# Patient Record
Sex: FEMALE | Race: WHITE | NOT HISPANIC OR LATINO
[De-identification: names, ages, dates, MRNs, and addresses within clinical notes are randomized per-mention and may not be internally consistent; named-entity substitution may affect disease eponyms.]

---

## 2017-01-06 ENCOUNTER — APPOINTMENT (OUTPATIENT)
Dept: NEUROLOGY | Facility: CLINIC | Age: 82
End: 2017-01-06

## 2017-01-17 ENCOUNTER — RX RENEWAL (OUTPATIENT)
Age: 82
End: 2017-01-17

## 2017-02-15 ENCOUNTER — APPOINTMENT (OUTPATIENT)
Dept: NEUROLOGY | Facility: CLINIC | Age: 82
End: 2017-02-15

## 2017-03-06 ENCOUNTER — APPOINTMENT (OUTPATIENT)
Dept: HEART AND VASCULAR | Facility: CLINIC | Age: 82
End: 2017-03-06

## 2017-03-06 VITALS
WEIGHT: 150 LBS | HEIGHT: 57.5 IN | DIASTOLIC BLOOD PRESSURE: 80 MMHG | HEART RATE: 67 BPM | BODY MASS INDEX: 31.92 KG/M2 | SYSTOLIC BLOOD PRESSURE: 140 MMHG

## 2017-03-06 DIAGNOSIS — I27.2 OTHER SECONDARY PULMONARY HYPERTENSION: ICD-10-CM

## 2017-03-07 ENCOUNTER — APPOINTMENT (OUTPATIENT)
Dept: NEUROLOGY | Facility: CLINIC | Age: 82
End: 2017-03-07

## 2017-03-13 ENCOUNTER — APPOINTMENT (OUTPATIENT)
Dept: NEUROLOGY | Facility: CLINIC | Age: 82
End: 2017-03-13

## 2017-03-23 ENCOUNTER — APPOINTMENT (OUTPATIENT)
Dept: OPHTHALMOLOGY | Facility: CLINIC | Age: 82
End: 2017-03-23

## 2017-04-19 ENCOUNTER — APPOINTMENT (OUTPATIENT)
Dept: NEUROLOGY | Facility: CLINIC | Age: 82
End: 2017-04-19

## 2017-04-19 VITALS
SYSTOLIC BLOOD PRESSURE: 183 MMHG | OXYGEN SATURATION: 96 % | BODY MASS INDEX: 32.13 KG/M2 | HEIGHT: 57.5 IN | WEIGHT: 151 LBS | DIASTOLIC BLOOD PRESSURE: 83 MMHG | HEART RATE: 62 BPM

## 2017-04-19 VITALS — DIASTOLIC BLOOD PRESSURE: 89 MMHG | SYSTOLIC BLOOD PRESSURE: 168 MMHG

## 2017-04-25 ENCOUNTER — APPOINTMENT (OUTPATIENT)
Dept: INTERNAL MEDICINE | Facility: CLINIC | Age: 82
End: 2017-04-25

## 2017-04-25 VITALS
WEIGHT: 151 LBS | BODY MASS INDEX: 32.11 KG/M2 | OXYGEN SATURATION: 91 % | SYSTOLIC BLOOD PRESSURE: 148 MMHG | DIASTOLIC BLOOD PRESSURE: 94 MMHG | HEART RATE: 74 BPM | TEMPERATURE: 98.5 F

## 2017-04-25 DIAGNOSIS — Z92.89 PERSONAL HISTORY OF OTHER MEDICAL TREATMENT: ICD-10-CM

## 2017-04-25 DIAGNOSIS — R41.3 OTHER AMNESIA: ICD-10-CM

## 2017-04-26 LAB
ALBUMIN SERPL ELPH-MCNC: 4.3 G/DL
ALP BLD-CCNC: 98 U/L
ALT SERPL-CCNC: 18 U/L
ANION GAP SERPL CALC-SCNC: 15 MMOL/L
AST SERPL-CCNC: 21 U/L
BASOPHILS # BLD AUTO: 0.05 K/UL
BASOPHILS NFR BLD AUTO: 0.7 %
BILIRUB SERPL-MCNC: 0.5 MG/DL
BUN SERPL-MCNC: 17 MG/DL
CALCIUM SERPL-MCNC: 10.1 MG/DL
CHLORIDE SERPL-SCNC: 94 MMOL/L
CK SERPL-CCNC: 84 U/L
CO2 SERPL-SCNC: 24 MMOL/L
CREAT SERPL-MCNC: 0.73 MG/DL
EOSINOPHIL # BLD AUTO: 0.12 K/UL
EOSINOPHIL NFR BLD AUTO: 1.7 %
GLUCOSE SERPL-MCNC: 88 MG/DL
HBA1C MFR BLD HPLC: 5.6 %
HCT VFR BLD CALC: 40.9 %
HGB BLD-MCNC: 13.4 G/DL
IMM GRANULOCYTES NFR BLD AUTO: 0 %
LYMPHOCYTES # BLD AUTO: 2.15 K/UL
LYMPHOCYTES NFR BLD AUTO: 30.8 %
MAN DIFF?: NORMAL
MCHC RBC-ENTMCNC: 29.8 PG
MCHC RBC-ENTMCNC: 32.8 GM/DL
MCV RBC AUTO: 90.9 FL
MONOCYTES # BLD AUTO: 0.73 K/UL
MONOCYTES NFR BLD AUTO: 10.5 %
NEUTROPHILS # BLD AUTO: 3.93 K/UL
NEUTROPHILS NFR BLD AUTO: 56.3 %
PLATELET # BLD AUTO: 255 K/UL
POTASSIUM SERPL-SCNC: 4.5 MMOL/L
PROT SERPL-MCNC: 7.4 G/DL
RBC # BLD: 4.5 M/UL
RBC # FLD: 15.2 %
SODIUM SERPL-SCNC: 133 MMOL/L
TSH SERPL-ACNC: 2.54 UIU/ML
VIT B12 SERPL-MCNC: 689 PG/ML
WBC # FLD AUTO: 6.98 K/UL

## 2017-05-07 ENCOUNTER — FORM ENCOUNTER (OUTPATIENT)
Age: 82
End: 2017-05-07

## 2017-05-08 ENCOUNTER — OUTPATIENT (OUTPATIENT)
Dept: OUTPATIENT SERVICES | Facility: HOSPITAL | Age: 82
LOS: 1 days | End: 2017-05-08
Payer: MEDICARE

## 2017-05-08 ENCOUNTER — RX RENEWAL (OUTPATIENT)
Age: 82
End: 2017-05-08

## 2017-05-08 PROCEDURE — 70551 MRI BRAIN STEM W/O DYE: CPT | Mod: 26

## 2017-05-08 PROCEDURE — 70551 MRI BRAIN STEM W/O DYE: CPT

## 2017-05-22 ENCOUNTER — INPATIENT (INPATIENT)
Facility: HOSPITAL | Age: 82
LOS: 7 days | Discharge: NURSING, REHAB OR LONG-TERM CARE | DRG: 481 | End: 2017-05-30
Attending: ORTHOPAEDIC SURGERY | Admitting: ORTHOPAEDIC SURGERY
Payer: MEDICARE

## 2017-05-22 VITALS
SYSTOLIC BLOOD PRESSURE: 193 MMHG | OXYGEN SATURATION: 99 % | DIASTOLIC BLOOD PRESSURE: 114 MMHG | HEART RATE: 83 BPM | WEIGHT: 190.04 LBS | TEMPERATURE: 98 F | RESPIRATION RATE: 20 BRPM | HEIGHT: 66 IN

## 2017-05-22 DIAGNOSIS — E78.5 HYPERLIPIDEMIA, UNSPECIFIED: ICD-10-CM

## 2017-05-22 DIAGNOSIS — R91.1 SOLITARY PULMONARY NODULE: ICD-10-CM

## 2017-05-22 DIAGNOSIS — E87.1 HYPO-OSMOLALITY AND HYPONATREMIA: ICD-10-CM

## 2017-05-22 DIAGNOSIS — M25.552 PAIN IN LEFT HIP: ICD-10-CM

## 2017-05-22 DIAGNOSIS — I10 ESSENTIAL (PRIMARY) HYPERTENSION: ICD-10-CM

## 2017-05-22 DIAGNOSIS — J44.9 CHRONIC OBSTRUCTIVE PULMONARY DISEASE, UNSPECIFIED: ICD-10-CM

## 2017-05-22 DIAGNOSIS — Z01.818 ENCOUNTER FOR OTHER PREPROCEDURAL EXAMINATION: ICD-10-CM

## 2017-05-22 LAB
ALBUMIN SERPL ELPH-MCNC: 4 G/DL — SIGNIFICANT CHANGE UP (ref 3.3–5)
ALP SERPL-CCNC: 98 U/L — SIGNIFICANT CHANGE UP (ref 40–120)
ALT FLD-CCNC: 19 U/L — SIGNIFICANT CHANGE UP (ref 10–45)
ANION GAP SERPL CALC-SCNC: 13 MMOL/L — SIGNIFICANT CHANGE UP (ref 5–17)
AST SERPL-CCNC: 26 U/L — SIGNIFICANT CHANGE UP (ref 10–40)
BILIRUB SERPL-MCNC: 0.4 MG/DL — SIGNIFICANT CHANGE UP (ref 0.2–1.2)
BLD GP AB SCN SERPL QL: NEGATIVE — SIGNIFICANT CHANGE UP
BUN SERPL-MCNC: 16 MG/DL — SIGNIFICANT CHANGE UP (ref 7–23)
CALCIUM SERPL-MCNC: 9.4 MG/DL — SIGNIFICANT CHANGE UP (ref 8.4–10.5)
CHLORIDE SERPL-SCNC: 95 MMOL/L — LOW (ref 96–108)
CO2 SERPL-SCNC: 23 MMOL/L — SIGNIFICANT CHANGE UP (ref 22–31)
CREAT SERPL-MCNC: 0.6 MG/DL — SIGNIFICANT CHANGE UP (ref 0.5–1.3)
GLUCOSE SERPL-MCNC: 120 MG/DL — HIGH (ref 70–99)
HCT VFR BLD CALC: 38.9 % — SIGNIFICANT CHANGE UP (ref 34.5–45)
HGB BLD-MCNC: 13.7 G/DL — SIGNIFICANT CHANGE UP (ref 11.5–15.5)
INR BLD: 1.03 — SIGNIFICANT CHANGE UP (ref 0.88–1.16)
MCHC RBC-ENTMCNC: 30.3 PG — SIGNIFICANT CHANGE UP (ref 27–34)
MCHC RBC-ENTMCNC: 35.2 G/DL — SIGNIFICANT CHANGE UP (ref 32–36)
MCV RBC AUTO: 86.1 FL — SIGNIFICANT CHANGE UP (ref 80–100)
PLATELET # BLD AUTO: 249 K/UL — SIGNIFICANT CHANGE UP (ref 150–400)
POTASSIUM SERPL-MCNC: 4 MMOL/L — SIGNIFICANT CHANGE UP (ref 3.5–5.3)
POTASSIUM SERPL-SCNC: 4 MMOL/L — SIGNIFICANT CHANGE UP (ref 3.5–5.3)
PROT SERPL-MCNC: 7.5 G/DL — SIGNIFICANT CHANGE UP (ref 6–8.3)
PROTHROM AB SERPL-ACNC: 11.4 SEC — SIGNIFICANT CHANGE UP (ref 9.8–12.7)
RBC # BLD: 4.52 M/UL — SIGNIFICANT CHANGE UP (ref 3.8–5.2)
RBC # FLD: 14 % — SIGNIFICANT CHANGE UP (ref 10.3–16.9)
RH IG SCN BLD-IMP: POSITIVE — SIGNIFICANT CHANGE UP
RH IG SCN BLD-IMP: POSITIVE — SIGNIFICANT CHANGE UP
SODIUM SERPL-SCNC: 131 MMOL/L — LOW (ref 135–145)
WBC # BLD: 12.8 K/UL — HIGH (ref 3.8–10.5)
WBC # FLD AUTO: 12.8 K/UL — HIGH (ref 3.8–10.5)

## 2017-05-22 PROCEDURE — 99285 EMERGENCY DEPT VISIT HI MDM: CPT | Mod: 25

## 2017-05-22 PROCEDURE — 93010 ELECTROCARDIOGRAM REPORT: CPT

## 2017-05-22 PROCEDURE — 71101 X-RAY EXAM UNILAT RIBS/CHEST: CPT | Mod: 26

## 2017-05-22 PROCEDURE — 73502 X-RAY EXAM HIP UNI 2-3 VIEWS: CPT | Mod: 26,LT

## 2017-05-22 PROCEDURE — 73552 X-RAY EXAM OF FEMUR 2/>: CPT | Mod: 26,LT

## 2017-05-22 PROCEDURE — 99223 1ST HOSP IP/OBS HIGH 75: CPT | Mod: GC

## 2017-05-22 RX ORDER — TRAMADOL HYDROCHLORIDE 50 MG/1
50 TABLET ORAL EVERY 4 HOURS
Qty: 0 | Refills: 0 | Status: DISCONTINUED | OUTPATIENT
Start: 2017-05-22 | End: 2017-05-23

## 2017-05-22 RX ORDER — DOCUSATE SODIUM 100 MG
100 CAPSULE ORAL THREE TIMES A DAY
Qty: 0 | Refills: 0 | Status: DISCONTINUED | OUTPATIENT
Start: 2017-05-22 | End: 2017-05-23

## 2017-05-22 RX ORDER — MORPHINE SULFATE 50 MG/1
2 CAPSULE, EXTENDED RELEASE ORAL EVERY 4 HOURS
Qty: 0 | Refills: 0 | Status: DISCONTINUED | OUTPATIENT
Start: 2017-05-22 | End: 2017-05-23

## 2017-05-22 RX ORDER — MORPHINE SULFATE 50 MG/1
4 CAPSULE, EXTENDED RELEASE ORAL ONCE
Qty: 0 | Refills: 0 | Status: DISCONTINUED | OUTPATIENT
Start: 2017-05-22 | End: 2017-05-22

## 2017-05-22 RX ORDER — OXYCODONE HYDROCHLORIDE 5 MG/1
10 TABLET ORAL EVERY 4 HOURS
Qty: 0 | Refills: 0 | Status: DISCONTINUED | OUTPATIENT
Start: 2017-05-22 | End: 2017-05-23

## 2017-05-22 RX ORDER — IPRATROPIUM/ALBUTEROL SULFATE 18-103MCG
3 AEROSOL WITH ADAPTER (GRAM) INHALATION EVERY 6 HOURS
Qty: 0 | Refills: 0 | Status: DISCONTINUED | OUTPATIENT
Start: 2017-05-22 | End: 2017-05-23

## 2017-05-22 RX ORDER — LOSARTAN POTASSIUM 100 MG/1
50 TABLET, FILM COATED ORAL DAILY
Qty: 0 | Refills: 0 | Status: DISCONTINUED | OUTPATIENT
Start: 2017-05-22 | End: 2017-05-23

## 2017-05-22 RX ORDER — IPRATROPIUM/ALBUTEROL SULFATE 18-103MCG
3 AEROSOL WITH ADAPTER (GRAM) INHALATION ONCE
Qty: 0 | Refills: 0 | Status: COMPLETED | OUTPATIENT
Start: 2017-05-22 | End: 2017-05-22

## 2017-05-22 RX ORDER — OXYCODONE HYDROCHLORIDE 5 MG/1
5 TABLET ORAL EVERY 4 HOURS
Qty: 0 | Refills: 0 | Status: DISCONTINUED | OUTPATIENT
Start: 2017-05-22 | End: 2017-05-22

## 2017-05-22 RX ORDER — SODIUM CHLORIDE 9 MG/ML
1000 INJECTION, SOLUTION INTRAVENOUS
Qty: 0 | Refills: 0 | Status: DISCONTINUED | OUTPATIENT
Start: 2017-05-22 | End: 2017-05-23

## 2017-05-22 RX ORDER — SIMVASTATIN 20 MG/1
10 TABLET, FILM COATED ORAL AT BEDTIME
Qty: 0 | Refills: 0 | Status: DISCONTINUED | OUTPATIENT
Start: 2017-05-22 | End: 2017-05-23

## 2017-05-22 RX ORDER — MORPHINE SULFATE 50 MG/1
4 CAPSULE, EXTENDED RELEASE ORAL EVERY 4 HOURS
Qty: 0 | Refills: 0 | Status: DISCONTINUED | OUTPATIENT
Start: 2017-05-22 | End: 2017-05-23

## 2017-05-22 RX ADMIN — MORPHINE SULFATE 4 MILLIGRAM(S): 50 CAPSULE, EXTENDED RELEASE ORAL at 22:15

## 2017-05-22 RX ADMIN — MORPHINE SULFATE 4 MILLIGRAM(S): 50 CAPSULE, EXTENDED RELEASE ORAL at 16:02

## 2017-05-22 RX ADMIN — Medication 3 MILLILITER(S): at 16:02

## 2017-05-22 RX ADMIN — MORPHINE SULFATE 4 MILLIGRAM(S): 50 CAPSULE, EXTENDED RELEASE ORAL at 16:40

## 2017-05-22 RX ADMIN — MORPHINE SULFATE 4 MILLIGRAM(S): 50 CAPSULE, EXTENDED RELEASE ORAL at 22:30

## 2017-05-22 RX ADMIN — MORPHINE SULFATE 4 MILLIGRAM(S): 50 CAPSULE, EXTENDED RELEASE ORAL at 18:51

## 2017-05-22 NOTE — CONSULT NOTE ADULT - PROBLEM SELECTOR RECOMMENDATION 7
the patient had a CT scan of chest followed by bronchoscopy with biopsy.  The results werr negative for underlying cancer cells.  Some of the nodules increased in size.  Repeat CT scan of chest

## 2017-05-22 NOTE — CONSULT NOTE ADULT - PROBLEM SELECTOR RECOMMENDATION 4
The patient's medical condition is optimized for surgery.  There is no contraindication for surgery.  There is no clinical evidence neither of angina, decompensated CHF, arrhthymias, nor valvular disease.   There is no limitation of exercise capacity.  MET is 4 .  ASA class is 2.  [place] cardiac risk factor is low .  DVT prophylaxis is indicated.  Pain control.  Early mobilization.  Avoid fluid overload.  Her dyspnea most likely relate dto COPD.  There I no evidence of CAD.

## 2017-05-22 NOTE — H&P ADULT - ASSESSMENT
A/P: 80yF with left hip fracture, possible left rib fractures, stable otherwise  -admit to Orthopaedic service  -operative treatment ORIF (IMN)  -preoperative planning: NPO, IVF  -pain control  -obtain medical clearance  -hold DVT prophylaxis  -restart home medications  -monitor vital signs  -nonweightbearing status until post procedure  -physical therapy evaluation  -discussed with senior resident and attending on call

## 2017-05-22 NOTE — ED PROVIDER NOTE - MUSCULOSKELETAL, MLM
Spine appears normal, range of motion is not limited, no muscle or joint tenderness Spine appears normal, range of motion is not limited, left hip deformity, leg length discrepancy and severe TTPleft hip, Pelvis stable. Distal ext NVI. +left posterior rib pain, no crepitus of flail.

## 2017-05-22 NOTE — ED PROVIDER NOTE - OBJECTIVE STATEMENT
81F with a h/o COPD, HTN, high cholesterol, depression who p/w left hip and side pain s/p mechanical fall today when she slipped in the hallway outside of her apartment. no head trauma, no LOC. Pt c/o severe 10/10 left hip pain and also left posterior rib pain. She also feels SOB when laying flat, c/w her COPD. Denies n/t in her ext. No headache or neck pain, no on bloodthinners. 81F with a h/o COPD, HTN, high cholesterol, depression who p/w left hip and side pain s/p mechanical fall today when she slipped in the hallway outside of her apartment. no head trauma, no LOC. Pt c/o severe 10/10 left hip pain and also left posterior rib pain. She also feels SOB when laying flat, c/w her COPD. Denies n/t in her ext. No headache or neck pain, not on blood thinners.

## 2017-05-22 NOTE — H&P ADULT - NSHPPHYSICALEXAM_GEN_ALL_CORE
5/5 GS/TA/EHL/FHL BL SILT to patients baseline. Left thigh minimally swollen, gross deformity, no ecchymoses. Skin intact, no breakage, no tenting. Palpable DP/PT BL. ROM and leg roll of left leg limited 2/2 pain.  TTP over anterior left thigh. Able to straight leg raise on right, not on left. Left leg shorter than right.     TTP over anterior-mid chest along ribs, diffuse, not focal    Skeletal survey negative. BUE motor and sensory intact, no contusion abrasion or injury. BLE intact other than previously noted.    Radiographs: left hip fracture, intertrochanteric, seen on hip and femur views. Rib views difficult to evaluate 5/5 GS/TA/EHL/FHL BL SILT to patients baseline. Left thigh minimally swollen, gross deformity, no ecchymoses. Skin intact, no breakage, no tenting. Palpable DP/PT BL. ROM and leg roll of left leg limited 2/2 pain.  TTP over anterior left thigh. Able to straight leg raise on right, not on left. Left leg shorter than right.     TTP over anterior-mid chest along ribs, diffuse, not focal    Skeletal survey negative. BLUE motor and sensory intact, no contusion abrasion or injury. BLLE intact other than previously noted.    Radiographs: left hip fracture, intertrochanteric, seen on hip and femur views. Rib views difficult to evaluate

## 2017-05-22 NOTE — ED ADULT NURSE NOTE - OBJECTIVE STATEMENT
Patient c/o left hip and rib pain after mechanical fall today. Patient denies loc. Left leg rotated and shortened. Patient unable to move leg. HRR S1S2. Lungs clear, no cough. Will continue to monitor patient.

## 2017-05-22 NOTE — H&P ADULT - HISTORY OF PRESENT ILLNESS
80y female PMH COPD, HLD, depression was walking at home and fell mechanically. Denies LOC, other injury besides hip and rib pain, denies numbness or tingling. ROS grossly negative, patient accompanied by son. Lives at home. Ambulates independently at baseline.

## 2017-05-22 NOTE — H&P ADULT - ATTENDING COMMENTS
Pt seen/ examined  Imaging studies reviewed  Risks/ benefits/ alternatives of surgery discussed with patient  Medical optimization pre-/ post-op  Will proceed to OR

## 2017-05-22 NOTE — ED PROVIDER NOTE - CONSTITUTIONAL, MLM
normal... Well appearing, well nourished, awake, alert, oriented to person, place, time/situation and distress due to pain

## 2017-05-22 NOTE — ED PROVIDER NOTE - MEDICAL DECISION MAKING DETAILS
81F with above PMHx with mechanical fall at home and exam consistent with left hip fracture and possible left posterior rib fractures. Pt also with slight wheezes and O2 93 c/w COPD- improved with nebs. Xr confirm left intertroch fx, possible left post rib fx, but no ptx on XR. Ortho consulted and will admit pt, requesting CT chest for further eval of ribs, which was ordered. pt pain controlled with morphine and VSS. Admit ortho.

## 2017-05-22 NOTE — ED ADULT TRIAGE NOTE - CHIEF COMPLAINT QUOTE
Pt CO Cough and Asthma Exacerbation x2 weeks.  Pt states "I just can't get any sleep and I feel like my chest is congested."  Pt denies Fevers, N/V/D, pt had mechanical fall & c/o left hip pain and rib pain

## 2017-05-22 NOTE — ED ADULT TRIAGE NOTE - NS ED TRIAGE CLINICAL UPGRADE
Pain - Patient was clinically upgraded due to pain. Deteriorating patient status - Patient was clinically upgraded due to deteriorating patient status.

## 2017-05-23 DIAGNOSIS — I35.0 NONRHEUMATIC AORTIC (VALVE) STENOSIS: ICD-10-CM

## 2017-05-23 DIAGNOSIS — S22.39XA FRACTURE OF ONE RIB, UNSPECIFIED SIDE, INITIAL ENCOUNTER FOR CLOSED FRACTURE: ICD-10-CM

## 2017-05-23 DIAGNOSIS — I27.2 OTHER SECONDARY PULMONARY HYPERTENSION: ICD-10-CM

## 2017-05-23 LAB
ANION GAP SERPL CALC-SCNC: 10 MMOL/L — SIGNIFICANT CHANGE UP (ref 5–17)
ANION GAP SERPL CALC-SCNC: 12 MMOL/L — SIGNIFICANT CHANGE UP (ref 5–17)
APPEARANCE UR: CLEAR — SIGNIFICANT CHANGE UP
BILIRUB UR-MCNC: NEGATIVE — SIGNIFICANT CHANGE UP
BUN SERPL-MCNC: 11 MG/DL — SIGNIFICANT CHANGE UP (ref 7–23)
BUN SERPL-MCNC: 14 MG/DL — SIGNIFICANT CHANGE UP (ref 7–23)
CALCIUM SERPL-MCNC: 8.2 MG/DL — LOW (ref 8.4–10.5)
CALCIUM SERPL-MCNC: 8.9 MG/DL — SIGNIFICANT CHANGE UP (ref 8.4–10.5)
CHLORIDE SERPL-SCNC: 94 MMOL/L — LOW (ref 96–108)
CHLORIDE SERPL-SCNC: 96 MMOL/L — SIGNIFICANT CHANGE UP (ref 96–108)
CO2 SERPL-SCNC: 23 MMOL/L — SIGNIFICANT CHANGE UP (ref 22–31)
CO2 SERPL-SCNC: 25 MMOL/L — SIGNIFICANT CHANGE UP (ref 22–31)
COLOR SPEC: YELLOW — SIGNIFICANT CHANGE UP
CREAT SERPL-MCNC: 0.6 MG/DL — SIGNIFICANT CHANGE UP (ref 0.5–1.3)
CREAT SERPL-MCNC: 0.6 MG/DL — SIGNIFICANT CHANGE UP (ref 0.5–1.3)
DIFF PNL FLD: NEGATIVE — SIGNIFICANT CHANGE UP
GLUCOSE SERPL-MCNC: 105 MG/DL — HIGH (ref 70–99)
GLUCOSE SERPL-MCNC: 125 MG/DL — HIGH (ref 70–99)
GLUCOSE UR QL: NEGATIVE — SIGNIFICANT CHANGE UP
HCT VFR BLD CALC: 31.5 % — LOW (ref 34.5–45)
HCT VFR BLD CALC: 35 % — SIGNIFICANT CHANGE UP (ref 34.5–45)
HGB BLD-MCNC: 10.8 G/DL — LOW (ref 11.5–15.5)
HGB BLD-MCNC: 12.7 G/DL — SIGNIFICANT CHANGE UP (ref 11.5–15.5)
KETONES UR-MCNC: NEGATIVE — SIGNIFICANT CHANGE UP
LEUKOCYTE ESTERASE UR-ACNC: NEGATIVE — SIGNIFICANT CHANGE UP
MCHC RBC-ENTMCNC: 30.2 PG — SIGNIFICANT CHANGE UP (ref 27–34)
MCHC RBC-ENTMCNC: 32 PG — SIGNIFICANT CHANGE UP (ref 27–34)
MCHC RBC-ENTMCNC: 34.3 G/DL — SIGNIFICANT CHANGE UP (ref 32–36)
MCHC RBC-ENTMCNC: 36.3 G/DL — HIGH (ref 32–36)
MCV RBC AUTO: 88 FL — SIGNIFICANT CHANGE UP (ref 80–100)
MCV RBC AUTO: 88.2 FL — SIGNIFICANT CHANGE UP (ref 80–100)
MRSA PCR RESULT.: NEGATIVE — SIGNIFICANT CHANGE UP
NITRITE UR-MCNC: NEGATIVE — SIGNIFICANT CHANGE UP
PH UR: 6 — SIGNIFICANT CHANGE UP (ref 5–8)
PLATELET # BLD AUTO: 199 K/UL — SIGNIFICANT CHANGE UP (ref 150–400)
PLATELET # BLD AUTO: 224 K/UL — SIGNIFICANT CHANGE UP (ref 150–400)
POTASSIUM SERPL-MCNC: 4.3 MMOL/L — SIGNIFICANT CHANGE UP (ref 3.5–5.3)
POTASSIUM SERPL-MCNC: 4.7 MMOL/L — SIGNIFICANT CHANGE UP (ref 3.5–5.3)
POTASSIUM SERPL-SCNC: 4.3 MMOL/L — SIGNIFICANT CHANGE UP (ref 3.5–5.3)
POTASSIUM SERPL-SCNC: 4.7 MMOL/L — SIGNIFICANT CHANGE UP (ref 3.5–5.3)
PROT UR-MCNC: NEGATIVE MG/DL — SIGNIFICANT CHANGE UP
RBC # BLD: 3.58 M/UL — LOW (ref 3.8–5.2)
RBC # BLD: 3.97 M/UL — SIGNIFICANT CHANGE UP (ref 3.8–5.2)
RBC # FLD: 14.2 % — SIGNIFICANT CHANGE UP (ref 10.3–16.9)
RBC # FLD: 14.7 % — SIGNIFICANT CHANGE UP (ref 10.3–16.9)
S AUREUS DNA NOSE QL NAA+PROBE: POSITIVE
SODIUM SERPL-SCNC: 129 MMOL/L — LOW (ref 135–145)
SODIUM SERPL-SCNC: 131 MMOL/L — LOW (ref 135–145)
SP GR SPEC: 1.01 — SIGNIFICANT CHANGE UP (ref 1–1.03)
UROBILINOGEN FLD QL: 0.2 E.U./DL — SIGNIFICANT CHANGE UP
WBC # BLD: 13.8 K/UL — HIGH (ref 3.8–10.5)
WBC # BLD: 15.4 K/UL — HIGH (ref 3.8–10.5)
WBC # FLD AUTO: 13.8 K/UL — HIGH (ref 3.8–10.5)
WBC # FLD AUTO: 15.4 K/UL — HIGH (ref 3.8–10.5)

## 2017-05-23 PROCEDURE — 93306 TTE W/DOPPLER COMPLETE: CPT | Mod: 26

## 2017-05-23 PROCEDURE — 99232 SBSQ HOSP IP/OBS MODERATE 35: CPT | Mod: GC

## 2017-05-23 PROCEDURE — 99233 SBSQ HOSP IP/OBS HIGH 50: CPT | Mod: GC

## 2017-05-23 PROCEDURE — 73551 X-RAY EXAM OF FEMUR 1: CPT | Mod: 26,LT

## 2017-05-23 PROCEDURE — 71250 CT THORAX DX C-: CPT | Mod: 26

## 2017-05-23 PROCEDURE — 72170 X-RAY EXAM OF PELVIS: CPT | Mod: 26

## 2017-05-23 RX ORDER — MORPHINE SULFATE 50 MG/1
2 CAPSULE, EXTENDED RELEASE ORAL
Qty: 0 | Refills: 0 | Status: DISCONTINUED | OUTPATIENT
Start: 2017-05-23 | End: 2017-05-24

## 2017-05-23 RX ORDER — SENNA PLUS 8.6 MG/1
2 TABLET ORAL AT BEDTIME
Qty: 0 | Refills: 0 | Status: DISCONTINUED | OUTPATIENT
Start: 2017-05-23 | End: 2017-05-30

## 2017-05-23 RX ORDER — POLYETHYLENE GLYCOL 3350 17 G/17G
17 POWDER, FOR SOLUTION ORAL DAILY
Qty: 0 | Refills: 0 | Status: DISCONTINUED | OUTPATIENT
Start: 2017-05-23 | End: 2017-05-30

## 2017-05-23 RX ORDER — SIMVASTATIN 20 MG/1
10 TABLET, FILM COATED ORAL AT BEDTIME
Qty: 0 | Refills: 0 | Status: DISCONTINUED | OUTPATIENT
Start: 2017-05-23 | End: 2017-05-30

## 2017-05-23 RX ORDER — ONDANSETRON 8 MG/1
4 TABLET, FILM COATED ORAL EVERY 4 HOURS
Qty: 0 | Refills: 0 | Status: COMPLETED | OUTPATIENT
Start: 2017-05-23 | End: 2017-05-23

## 2017-05-23 RX ORDER — SODIUM CHLORIDE 9 MG/ML
1000 INJECTION, SOLUTION INTRAVENOUS
Qty: 0 | Refills: 0 | Status: DISCONTINUED | OUTPATIENT
Start: 2017-05-23 | End: 2017-05-24

## 2017-05-23 RX ORDER — ACETAMINOPHEN 500 MG
650 TABLET ORAL EVERY 6 HOURS
Qty: 0 | Refills: 0 | Status: DISCONTINUED | OUTPATIENT
Start: 2017-05-23 | End: 2017-05-30

## 2017-05-23 RX ORDER — ALBUTEROL 90 UG/1
1 AEROSOL, METERED ORAL EVERY 4 HOURS
Qty: 0 | Refills: 0 | Status: DISCONTINUED | OUTPATIENT
Start: 2017-05-23 | End: 2017-05-30

## 2017-05-23 RX ORDER — ONDANSETRON 8 MG/1
4 TABLET, FILM COATED ORAL EVERY 6 HOURS
Qty: 0 | Refills: 0 | Status: DISCONTINUED | OUTPATIENT
Start: 2017-05-23 | End: 2017-05-30

## 2017-05-23 RX ORDER — TIOTROPIUM BROMIDE 18 UG/1
1 CAPSULE ORAL; RESPIRATORY (INHALATION) DAILY
Qty: 0 | Refills: 0 | Status: COMPLETED | OUTPATIENT
Start: 2017-05-23 | End: 2018-04-21

## 2017-05-23 RX ORDER — CEFAZOLIN SODIUM 1 G
2000 VIAL (EA) INJECTION EVERY 8 HOURS
Qty: 0 | Refills: 0 | Status: COMPLETED | OUTPATIENT
Start: 2017-05-24 | End: 2017-05-24

## 2017-05-23 RX ORDER — TRAMADOL HYDROCHLORIDE 50 MG/1
50 TABLET ORAL EVERY 4 HOURS
Qty: 0 | Refills: 0 | Status: DISCONTINUED | OUTPATIENT
Start: 2017-05-23 | End: 2017-05-25

## 2017-05-23 RX ORDER — DOCUSATE SODIUM 100 MG
100 CAPSULE ORAL THREE TIMES A DAY
Qty: 0 | Refills: 0 | Status: DISCONTINUED | OUTPATIENT
Start: 2017-05-23 | End: 2017-05-23

## 2017-05-23 RX ORDER — LOSARTAN POTASSIUM 100 MG/1
50 TABLET, FILM COATED ORAL DAILY
Qty: 0 | Refills: 0 | Status: DISCONTINUED | OUTPATIENT
Start: 2017-05-23 | End: 2017-05-30

## 2017-05-23 RX ORDER — IPRATROPIUM/ALBUTEROL SULFATE 18-103MCG
3 AEROSOL WITH ADAPTER (GRAM) INHALATION EVERY 6 HOURS
Qty: 0 | Refills: 0 | Status: DISCONTINUED | OUTPATIENT
Start: 2017-05-23 | End: 2017-05-30

## 2017-05-23 RX ORDER — DOCUSATE SODIUM 100 MG
100 CAPSULE ORAL THREE TIMES A DAY
Qty: 0 | Refills: 0 | Status: DISCONTINUED | OUTPATIENT
Start: 2017-05-23 | End: 2017-05-30

## 2017-05-23 RX ORDER — ASPIRIN/CALCIUM CARB/MAGNESIUM 324 MG
325 TABLET ORAL
Qty: 0 | Refills: 0 | Status: DISCONTINUED | OUTPATIENT
Start: 2017-05-24 | End: 2017-05-30

## 2017-05-23 RX ORDER — MORPHINE SULFATE 50 MG/1
2 CAPSULE, EXTENDED RELEASE ORAL EVERY 4 HOURS
Qty: 0 | Refills: 0 | Status: DISCONTINUED | OUTPATIENT
Start: 2017-05-23 | End: 2017-05-24

## 2017-05-23 RX ADMIN — MORPHINE SULFATE 4 MILLIGRAM(S): 50 CAPSULE, EXTENDED RELEASE ORAL at 03:30

## 2017-05-23 RX ADMIN — TRAMADOL HYDROCHLORIDE 50 MILLIGRAM(S): 50 TABLET ORAL at 06:08

## 2017-05-23 RX ADMIN — MORPHINE SULFATE 4 MILLIGRAM(S): 50 CAPSULE, EXTENDED RELEASE ORAL at 11:12

## 2017-05-23 RX ADMIN — MORPHINE SULFATE 2 MILLIGRAM(S): 50 CAPSULE, EXTENDED RELEASE ORAL at 21:27

## 2017-05-23 RX ADMIN — TRAMADOL HYDROCHLORIDE 50 MILLIGRAM(S): 50 TABLET ORAL at 01:03

## 2017-05-23 RX ADMIN — MORPHINE SULFATE 4 MILLIGRAM(S): 50 CAPSULE, EXTENDED RELEASE ORAL at 03:13

## 2017-05-23 RX ADMIN — MORPHINE SULFATE 2 MILLIGRAM(S): 50 CAPSULE, EXTENDED RELEASE ORAL at 21:26

## 2017-05-23 RX ADMIN — TRAMADOL HYDROCHLORIDE 50 MILLIGRAM(S): 50 TABLET ORAL at 07:00

## 2017-05-23 RX ADMIN — TRAMADOL HYDROCHLORIDE 50 MILLIGRAM(S): 50 TABLET ORAL at 02:00

## 2017-05-23 RX ADMIN — ONDANSETRON 4 MILLIGRAM(S): 8 TABLET, FILM COATED ORAL at 01:25

## 2017-05-23 RX ADMIN — MORPHINE SULFATE 4 MILLIGRAM(S): 50 CAPSULE, EXTENDED RELEASE ORAL at 11:25

## 2017-05-23 RX ADMIN — Medication 3 MILLILITER(S): at 06:09

## 2017-05-23 RX ADMIN — Medication 3 MILLILITER(S): at 11:13

## 2017-05-23 NOTE — PROGRESS NOTE ADULT - ASSESSMENT
77 y/o F with COPD and known pulmonary amyloidosis admitted wit L hip and rib fractures going for ORIF today

## 2017-05-23 NOTE — PROGRESS NOTE ADULT - SUBJECTIVE AND OBJECTIVE BOX
Interval Events: reviewed  Patient seen and examined at bedside.    Patient is a 81y old  Female who presents with a chief complaint of left hip pain, left rib pain (22 May 2017 17:20)  she is doing OK and she is waiting for surgery.  The hip hurts    PAST MEDICAL & SURGICAL HISTORY:      MEDICATIONS:  Pulmonary:  ALBUTerol/ipratropium for Nebulization 3milliLiter(s) Nebulizer every 6 hours    Antimicrobials:    Anticoagulants:    Cardiac:  losartan 50milliGRAM(s) Oral daily      Allergies    penicillins (Rash)    Intolerances        Vital Signs Last 24 Hrs  T(C): 36.7, Max: 36.7 ( @ 15:12)  T(F): 98, Max: 98 ( @ 15:12)  HR: 93 (80 - 97)  BP: 110/66 (110/66 - 167/114)  BP(mean): --  RR: 16 (16 - 20)  SpO2: 92% (88% - 96%)    I & Os for current day (as of  @ 11:54)  =============================================  IN: 0 ml / OUT: 250 ml / NET: -250 ml        LABS:      CBC Full  -  ( 23 May 2017 08:23 )  WBC Count : 13.8 K/uL  Hemoglobin : 12.7 g/dL  Hematocrit : 35.0 %  Platelet Count - Automated : 224 K/uL  Mean Cell Volume : 88.2 fL  Mean Cell Hemoglobin : 32.0 pg  Mean Cell Hemoglobin Concentration : 36.3 g/dL  Auto Neutrophil # : x  Auto Lymphocyte # : x  Auto Monocyte # : x  Auto Eosinophil # : x  Auto Basophil # : x  Auto Neutrophil % : x  Auto Lymphocyte % : x  Auto Monocyte % : x  Auto Eosinophil % : x  Auto Basophil % : x        131<L>  |  96  |  14  ----------------------------<  105<H>  4.7   |  25  |  0.60    Ca    8.9      23 May 2017 08:23    TPro  7.5  /  Alb  4.0  /  TBili  0.4  /  DBili  x   /  AST  26  /  ALT  19  /  AlkPhos  98  05-    PT/INR - ( 22 May 2017 15:25 )   PT: 11.4 sec;   INR: 1.03                Urinalysis Basic - ( 23 May 2017 00:55 )    Color: Yellow / Appearance: Clear / S.015 / pH: x  Gluc: x / Ketone: NEGATIVE  / Bili: NEGATIVE / Urobili: 0.2 E.U./dL   Blood: x / Protein: NEGATIVE mg/dL / Nitrite: NEGATIVE   Leuk Esterase: NEGATIVE / RBC: x / WBC x   Sq Epi: x / Non Sq Epi: x / Bacteria: x                  RADIOLOGY & ADDITIONAL STUDIES (The following images were personally reviewed):  Stanton:                                 No  Urine output:               Yes          DVT prophylaxis:         Yes          Flattus:                          Yes         Bowel movement:               No

## 2017-05-23 NOTE — CHART NOTE - NSCHARTNOTEFT_GEN_A_CORE
I examined the patient in the recovery room.  She was hemodynamically stable wit np change int he exam.  I discussed the caser in details with the family and patient is stable for 9 w

## 2017-05-23 NOTE — PROGRESS NOTE ADULT - SUBJECTIVE AND OBJECTIVE BOX
Ortho Preop Note    Patient is a 81y old  Female who presents with a chief complaint of left hip pain, left rib pain (22 May 2017 17:20)    Diagnosis: Left hip IT fracture  Procedure: Left hip IMN  Surgeon: Lori                          13.7   12.8  )-----------( 249      ( 22 May 2017 15:25 )             38.9     05-22    131<L>  |  95<L>  |  16  ----------------------------<  120<H>  4.0   |  23  |  0.60    Ca    9.4      22 May 2017 15:25    TPro  7.5  /  Alb  4.0  /  TBili  0.4  /  DBili  x   /  AST  26  /  ALT  19  /  AlkPhos  98  05-22    PT/INR - ( 22 May 2017 15:25 )   PT: 11.4 sec;   INR: 1.03                [x] Type & Screen  [x] CBC  [x] BMP  [x] PT/PTT/INR  [ ] Urinalysis  [x] Chest X-ray  [x] EKG  [x] NPO/IVF  [x] Consent  [ ] Clearance  [x] Added on to OR Schedule  [x] Anti-coagulation held  [ ] MRSA/MSSA Nasal Screen

## 2017-05-23 NOTE — PROGRESS NOTE ADULT - SUBJECTIVE AND OBJECTIVE BOX
Ortho Post Op Check    Pt comfortable without complaints, pain controlled  Denies CP, SOB, N/V, numbness/tingling     Vital Signs Last 24 Hrs  T(C): 36.2, Max: 36.2 (05-23 @ 18:25)  T(F): 97.1, Max: 97.1 (05-23 @ 18:25)  HR: 82 (82 - 107)  BP: 100/44 (91/46 - 145/62)  BP(mean): --  RR: 18 (14 - 18)  SpO2: 97% (94% - 99%)  Wt(kg): --  AVSS    General: Pt Alert and oriented, NAD    Affected extremity  DSG C/D/I  Motor:   4/5 EHL/FHL/TA/GS  Sensory: SILT  wwp                                                                       10.8   15.4  )-----------( 199      ( 23 May 2017 18:57 )             31.5   23 May 2017 18:57    129    |  94     |  11     ----------------------------<  125    4.3     |  23     |  0.60     Ca    8.2        23 May 2017 18:57    TPro  7.5    /  Alb  4.0    /  TBili  0.4    /  DBili  x      /  AST  26     /  ALT  19     /  AlkPhos  98     22 May 2017 15:25      Post-op X-Ray:    A/P: 81yFemale POD#0 s/p L Hip IMN  - Stable  - Pain Control  - DVT ppx: ASA  -  Post op xray  - Post op abx: Ancef  - PT, WBS: WBAT  -  Pt assessed by Dr. Sarah and Dr. Dominguez, stable enough for patient to go to 9wo telemetry unit

## 2017-05-23 NOTE — PROGRESS NOTE ADULT - SUBJECTIVE AND OBJECTIVE BOX
S: Patient seen and examined. Doing well this AM. Pain reported but controlled. No acute events overnight.    O:   Vital Signs Last 24 Hrs  T(C): 36.4, Max: 36.7 (05-22 @ 15:12)  T(F): 97.6, Max: 98 (05-22 @ 15:12)  HR: 97 (80 - 97)  BP: 118/70 (118/70 - 167/114)  BP(mean): --  RR: 20 (16 - 20)  SpO2: 93% (88% - 96%)    Motor: 5/5 GS/TA/EHL/FHL BL   SILT to patients baseline BL  WWP DP PT BL

## 2017-05-23 NOTE — PROGRESS NOTE ADULT - SUBJECTIVE AND OBJECTIVE BOX
Interval Events:  Patient seen and examined at bedside. No acute events overnight. Seen by Dr Dominguez for pre-op clearance. known patient of Dr Zamudio with COPD and pulmonary amyloidosis diagnosed on Bronchoscopy. No pulmonary complaints today    ROS: Negative except above    MEDICATIONS:    lactated ringers. 1000milliLiter(s) IV Continuous <Continuous>  oxyCODONE IR 10milliGRAM(s) Oral every 4 hours PRN  morphine  - Injectable 2milliGRAM(s) IV Push every 4 hours PRN  morphine  - Injectable 4milliGRAM(s) IV Push every 4 hours PRN  docusate sodium 100milliGRAM(s) Oral three times a day  ALBUTerol/ipratropium for Nebulization 3milliLiter(s) Nebulizer every 6 hours  traMADol 50milliGRAM(s) Oral every 4 hours PRN  losartan 50milliGRAM(s) Oral daily  simvastatin 10milliGRAM(s) Oral at bedtime      Allergies    penicillins (Rash)    Intolerances        Vital Signs Last 24 Hrs  T(C): 36.7, Max: 36.7 ( @ 15:12)  T(F): 98, Max: 98 ( @ 15:12)  HR: 93 (80 - 97)  BP: 110/66 (110/66 - 167/114)  BP(mean): --  RR: 16 (16 - 20)  SpO2: 92% (88% - 96%)    I & Os for current day (as of  @ 11:26)  =============================================  IN: 0 ml / OUT: 250 ml / NET: -250 ml        LABS:      CBC Full  -  ( 23 May 2017 08:23 )  WBC Count : 13.8 K/uL  Hemoglobin : 12.7 g/dL  Hematocrit : 35.0 %  Platelet Count - Automated : 224 K/uL  Mean Cell Volume : 88.2 fL  Mean Cell Hemoglobin : 32.0 pg  Mean Cell Hemoglobin Concentration : 36.3 g/dL        131<L>  |  96  |  14  ----------------------------<  105<H>  4.7   |  25  |  0.60    Ca    8.9      23 May 2017 08:23    TPro  7.5  /  Alb  4.0  /  TBili  0.4  /  DBili  x   /  AST  26  /  ALT  19  /  AlkPhos  98  05-22    PT/INR - ( 22 May 2017 15:25 )   PT: 11.4 sec;   INR: 1.03                Urinalysis Basic - ( 23 May 2017 00:55 )    Color: Yellow / Appearance: Clear / S.015 / pH: x  Gluc: x / Ketone: NEGATIVE  / Bili: NEGATIVE / Urobili: 0.2 E.U./dL   Blood: x / Protein: NEGATIVE mg/dL / Nitrite: NEGATIVE   Leuk Esterase: NEGATIVE / RBC: x / WBC x   Sq Epi: x / Non Sq Epi: x / Bacteria: x                RADIOLOGY & ADDITIONAL STUDIES (The following images were personally reviewed): Interval Events:  Patient seen and examined at bedside. No acute events overnight. Seen by Dr Dominguez for pre-op clearance. known patient of Dr Zamudio with COPD and pulmonary amyloidosis diagnosed on Bronchoscopy. No pulmonary complaints today    ROS: Negative except above    MEDICATIONS:    lactated ringers. 1000milliLiter(s) IV Continuous <Continuous>  oxyCODONE IR 10milliGRAM(s) Oral every 4 hours PRN  morphine  - Injectable 2milliGRAM(s) IV Push every 4 hours PRN  morphine  - Injectable 4milliGRAM(s) IV Push every 4 hours PRN  docusate sodium 100milliGRAM(s) Oral three times a day  ALBUTerol/ipratropium for Nebulization 3milliLiter(s) Nebulizer every 6 hours  traMADol 50milliGRAM(s) Oral every 4 hours PRN  losartan 50milliGRAM(s) Oral daily  simvastatin 10milliGRAM(s) Oral at bedtime      Allergies    penicillins (Rash)    Intolerances        Vital Signs Last 24 Hrs  T(C): 36.7, Max: 36.7 ( @ 15:12)  T(F): 98, Max: 98 ( @ 15:12)  HR: 93 (80 - 97)  BP: 110/66 (110/66 - 167/114)  BP(mean): --  RR: 16 (16 - 20)  SpO2: 92% (88% - 96%)    I & Os for current day (as of  @ 11:26)  =============================================  IN: 0 ml / OUT: 250 ml / NET: -250 ml        LABS:      CBC Full  -  ( 23 May 2017 08:23 )  WBC Count : 13.8 K/uL  Hemoglobin : 12.7 g/dL  Hematocrit : 35.0 %  Platelet Count - Automated : 224 K/uL  Mean Cell Volume : 88.2 fL  Mean Cell Hemoglobin : 32.0 pg  Mean Cell Hemoglobin Concentration : 36.3 g/dL        131<L>  |  96  |  14  ----------------------------<  105<H>  4.7   |  25  |  0.60    Ca    8.9      23 May 2017 08:23    TPro  7.5  /  Alb  4.0  /  TBili  0.4  /  DBili  x   /  AST  26  /  ALT  19  /  AlkPhos  98  05-22    PT/INR - ( 22 May 2017 15:25 )   PT: 11.4 sec;   INR: 1.03                Urinalysis Basic - ( 23 May 2017 00:55 )    Color: Yellow / Appearance: Clear / S.015 / pH: x  Gluc: x / Ketone: NEGATIVE  / Bili: NEGATIVE / Urobili: 0.2 E.U./dL   Blood: x / Protein: NEGATIVE mg/dL / Nitrite: NEGATIVE   Leuk Esterase: NEGATIVE / RBC: x / WBC x   Sq Epi: x / Non Sq Epi: x / Bacteria: x        RADIOLOGY & ADDITIONAL STUDIES (The following images were personally reviewed): chest CT

## 2017-05-23 NOTE — PROGRESS NOTE ADULT - ASSESSMENT
[facility] 81[facility]  -stable, OR today  -pain control  -SCD  -NPO/IVF  -f/u labs  -disposition: OR today

## 2017-05-24 LAB
ANION GAP SERPL CALC-SCNC: 11 MMOL/L — SIGNIFICANT CHANGE UP (ref 5–17)
BUN SERPL-MCNC: 11 MG/DL — SIGNIFICANT CHANGE UP (ref 7–23)
CALCIUM SERPL-MCNC: 7.9 MG/DL — LOW (ref 8.4–10.5)
CHLORIDE SERPL-SCNC: 96 MMOL/L — SIGNIFICANT CHANGE UP (ref 96–108)
CO2 SERPL-SCNC: 24 MMOL/L — SIGNIFICANT CHANGE UP (ref 22–31)
CREAT SERPL-MCNC: 0.6 MG/DL — SIGNIFICANT CHANGE UP (ref 0.5–1.3)
CULTURE RESULTS: NO GROWTH — SIGNIFICANT CHANGE UP
GLUCOSE SERPL-MCNC: 103 MG/DL — HIGH (ref 70–99)
HCT VFR BLD CALC: 27.2 % — LOW (ref 34.5–45)
HGB BLD-MCNC: 9.2 G/DL — LOW (ref 11.5–15.5)
MCHC RBC-ENTMCNC: 30.1 PG — SIGNIFICANT CHANGE UP (ref 27–34)
MCHC RBC-ENTMCNC: 33.8 G/DL — SIGNIFICANT CHANGE UP (ref 32–36)
MCV RBC AUTO: 88.9 FL — SIGNIFICANT CHANGE UP (ref 80–100)
PLATELET # BLD AUTO: 169 K/UL — SIGNIFICANT CHANGE UP (ref 150–400)
POTASSIUM SERPL-MCNC: 4.3 MMOL/L — SIGNIFICANT CHANGE UP (ref 3.5–5.3)
POTASSIUM SERPL-SCNC: 4.3 MMOL/L — SIGNIFICANT CHANGE UP (ref 3.5–5.3)
RBC # BLD: 3.06 M/UL — LOW (ref 3.8–5.2)
RBC # FLD: 14.9 % — SIGNIFICANT CHANGE UP (ref 10.3–16.9)
SODIUM SERPL-SCNC: 131 MMOL/L — LOW (ref 135–145)
SPECIMEN SOURCE: SIGNIFICANT CHANGE UP
WBC # BLD: 11.7 K/UL — HIGH (ref 3.8–10.5)
WBC # FLD AUTO: 11.7 K/UL — HIGH (ref 3.8–10.5)

## 2017-05-24 PROCEDURE — 99232 SBSQ HOSP IP/OBS MODERATE 35: CPT | Mod: GC

## 2017-05-24 PROCEDURE — 99233 SBSQ HOSP IP/OBS HIGH 50: CPT | Mod: GC

## 2017-05-24 RX ORDER — BUDESONIDE AND FORMOTEROL FUMARATE DIHYDRATE 160; 4.5 UG/1; UG/1
2 AEROSOL RESPIRATORY (INHALATION)
Qty: 0 | Refills: 0 | Status: DISCONTINUED | OUTPATIENT
Start: 2017-05-24 | End: 2017-05-30

## 2017-05-24 RX ORDER — BUDESONIDE AND FORMOTEROL FUMARATE DIHYDRATE 160; 4.5 UG/1; UG/1
2 AEROSOL RESPIRATORY (INHALATION)
Qty: 0 | Refills: 0 | Status: DISCONTINUED | OUTPATIENT
Start: 2017-05-24 | End: 2017-05-24

## 2017-05-24 RX ORDER — ACETAMINOPHEN 500 MG
975 TABLET ORAL EVERY 8 HOURS
Qty: 0 | Refills: 0 | Status: DISCONTINUED | OUTPATIENT
Start: 2017-05-24 | End: 2017-05-30

## 2017-05-24 RX ORDER — LIDOCAINE 4 G/100G
1 CREAM TOPICAL DAILY
Qty: 0 | Refills: 0 | Status: DISCONTINUED | OUTPATIENT
Start: 2017-05-24 | End: 2017-05-30

## 2017-05-24 RX ORDER — ACETAMINOPHEN 500 MG
1000 TABLET ORAL ONCE
Qty: 0 | Refills: 0 | Status: COMPLETED | OUTPATIENT
Start: 2017-05-24 | End: 2017-05-24

## 2017-05-24 RX ORDER — CELECOXIB 200 MG/1
200 CAPSULE ORAL
Qty: 0 | Refills: 0 | Status: DISCONTINUED | OUTPATIENT
Start: 2017-05-24 | End: 2017-05-30

## 2017-05-24 RX ORDER — SODIUM CHLORIDE 9 MG/ML
1000 INJECTION INTRAMUSCULAR; INTRAVENOUS; SUBCUTANEOUS
Qty: 0 | Refills: 0 | Status: DISCONTINUED | OUTPATIENT
Start: 2017-05-24 | End: 2017-05-26

## 2017-05-24 RX ORDER — OXYCODONE HYDROCHLORIDE 5 MG/1
5 TABLET ORAL EVERY 4 HOURS
Qty: 0 | Refills: 0 | Status: DISCONTINUED | OUTPATIENT
Start: 2017-05-24 | End: 2017-05-25

## 2017-05-24 RX ADMIN — Medication 100 MILLIGRAM(S): at 06:15

## 2017-05-24 RX ADMIN — Medication 975 MILLIGRAM(S): at 22:15

## 2017-05-24 RX ADMIN — MORPHINE SULFATE 2 MILLIGRAM(S): 50 CAPSULE, EXTENDED RELEASE ORAL at 11:22

## 2017-05-24 RX ADMIN — TRAMADOL HYDROCHLORIDE 50 MILLIGRAM(S): 50 TABLET ORAL at 03:34

## 2017-05-24 RX ADMIN — Medication 975 MILLIGRAM(S): at 13:15

## 2017-05-24 RX ADMIN — MORPHINE SULFATE 2 MILLIGRAM(S): 50 CAPSULE, EXTENDED RELEASE ORAL at 06:14

## 2017-05-24 RX ADMIN — BUDESONIDE AND FORMOTEROL FUMARATE DIHYDRATE 2 PUFF(S): 160; 4.5 AEROSOL RESPIRATORY (INHALATION) at 18:01

## 2017-05-24 RX ADMIN — Medication 100 MILLIGRAM(S): at 21:44

## 2017-05-24 RX ADMIN — Medication 325 MILLIGRAM(S): at 18:01

## 2017-05-24 RX ADMIN — CELECOXIB 200 MILLIGRAM(S): 200 CAPSULE ORAL at 18:30

## 2017-05-24 RX ADMIN — Medication 975 MILLIGRAM(S): at 21:43

## 2017-05-24 RX ADMIN — TRAMADOL HYDROCHLORIDE 50 MILLIGRAM(S): 50 TABLET ORAL at 04:30

## 2017-05-24 RX ADMIN — Medication 3 MILLILITER(S): at 12:42

## 2017-05-24 RX ADMIN — SIMVASTATIN 10 MILLIGRAM(S): 20 TABLET, FILM COATED ORAL at 21:44

## 2017-05-24 RX ADMIN — LOSARTAN POTASSIUM 50 MILLIGRAM(S): 100 TABLET, FILM COATED ORAL at 06:15

## 2017-05-24 RX ADMIN — TRAMADOL HYDROCHLORIDE 50 MILLIGRAM(S): 50 TABLET ORAL at 10:15

## 2017-05-24 RX ADMIN — Medication 975 MILLIGRAM(S): at 12:42

## 2017-05-24 RX ADMIN — Medication 100 MILLIGRAM(S): at 03:34

## 2017-05-24 RX ADMIN — TRAMADOL HYDROCHLORIDE 50 MILLIGRAM(S): 50 TABLET ORAL at 18:30

## 2017-05-24 RX ADMIN — TRAMADOL HYDROCHLORIDE 50 MILLIGRAM(S): 50 TABLET ORAL at 09:45

## 2017-05-24 RX ADMIN — Medication 400 MILLIGRAM(S): at 12:49

## 2017-05-24 RX ADMIN — Medication 100 MILLIGRAM(S): at 13:53

## 2017-05-24 RX ADMIN — SODIUM CHLORIDE 80 MILLILITER(S): 9 INJECTION INTRAMUSCULAR; INTRAVENOUS; SUBCUTANEOUS at 12:42

## 2017-05-24 RX ADMIN — Medication 325 MILLIGRAM(S): at 06:15

## 2017-05-24 RX ADMIN — Medication 3 MILLILITER(S): at 06:14

## 2017-05-24 RX ADMIN — MORPHINE SULFATE 2 MILLIGRAM(S): 50 CAPSULE, EXTENDED RELEASE ORAL at 12:00

## 2017-05-24 RX ADMIN — Medication 100 MILLIGRAM(S): at 11:03

## 2017-05-24 RX ADMIN — CELECOXIB 200 MILLIGRAM(S): 200 CAPSULE ORAL at 18:01

## 2017-05-24 RX ADMIN — LIDOCAINE 1 PATCH: 4 CREAM TOPICAL at 12:42

## 2017-05-24 RX ADMIN — MORPHINE SULFATE 2 MILLIGRAM(S): 50 CAPSULE, EXTENDED RELEASE ORAL at 06:30

## 2017-05-24 RX ADMIN — TRAMADOL HYDROCHLORIDE 50 MILLIGRAM(S): 50 TABLET ORAL at 18:15

## 2017-05-24 RX ADMIN — Medication 1000 MILLIGRAM(S): at 13:15

## 2017-05-24 RX ADMIN — POLYETHYLENE GLYCOL 3350 17 GRAM(S): 17 POWDER, FOR SOLUTION ORAL at 12:42

## 2017-05-24 NOTE — PROGRESS NOTE ADULT - SUBJECTIVE AND OBJECTIVE BOX
Interval Events: reviewed  Patient seen and examined at bedside.    Patient is a 81y old  Female who presents with a chief complaint of left hip pain, left rib pain (22 May 2017 17:20)  she is doing well. She is eating. She passing gas. She was told that she can have physical therapy for the    PAST MEDICAL & SURGICAL HISTORY:      MEDICATIONS:  Pulmonary:  ALBUTerol/ipratropium for Nebulization 3milliLiter(s) Nebulizer every 6 hours  ALBUTerol    90 MICROgram(s) HFA Inhaler 1Puff(s) Inhalation every 4 hours  tiotropium 18 MICROgram(s) Capsule 1Capsule(s) Inhalation daily  buDESOnide 160 MICROgram(s)/formoterol 4.5 MICROgram(s) Inhaler 2Puff(s) Inhalation two times a day    Antimicrobials:    Anticoagulants:  aspirin enteric coated 325milliGRAM(s) Oral two times a day    Cardiac:  losartan 50milliGRAM(s) Oral daily      Allergies    penicillins (Rash)    Intolerances        Vital Signs Last 24 Hrs  T(C): 37.3, Max: 37.9 ( @ 05:46)  T(F): 99.1, Max: 100.2 ( @ 05:46)  HR: 103 (82 - 107)  BP: 97/50 (91/46 - 145/62)  BP(mean): --  RR: 15 (14 - 18)  SpO2: 100% (91% - 100%)  I & Os for 24h ending  @ 07:00  =============================================  IN: 1440 ml / OUT: 400 ml / NET: 1040 ml    I & Os for current day (as of  @ 17:26)  =============================================  IN: 1110 ml / OUT: 600 ml / NET: 510 ml        LABS:      CBC Full  -  ( 24 May 2017 06:44 )  WBC Count : 11.7 K/uL  Hemoglobin : 9.2 g/dL  Hematocrit : 27.2 %  Platelet Count - Automated : 169 K/uL  Mean Cell Volume : 88.9 fL  Mean Cell Hemoglobin : 30.1 pg  Mean Cell Hemoglobin Concentration : 33.8 g/dL  Auto Neutrophil # : x  Auto Lymphocyte # : x  Auto Monocyte # : x  Auto Eosinophil # : x  Auto Basophil # : x  Auto Neutrophil % : x  Auto Lymphocyte % : x  Auto Monocyte % : x  Auto Eosinophil % : x  Auto Basophil % : x        131<L>  |  96  |  11  ----------------------------<  103<H>  4.3   |  24  |  0.60    Ca    7.9<L>      24 May 2017 06:44            Urinalysis Basic - ( 23 May 2017 00:55 )    Color: Yellow / Appearance: Clear / S.015 / pH: x  Gluc: x / Ketone: NEGATIVE  / Bili: NEGATIVE / Urobili: 0.2 E.U./dL   Blood: x / Protein: NEGATIVE mg/dL / Nitrite: NEGATIVE   Leuk Esterase: NEGATIVE / RBC: x / WBC x   Sq Epi: x / Non Sq Epi: x / Bacteria: x              Culture Results:   No growth ( @ 08:21)      RADIOLOGY & ADDITIONAL STUDIES (The following images were personally reviewed):  Stanton:                            Yes     Urine output:               Yes          DVT prophylaxis:         Yes          Flattus:                          Yes          Bowel movement:       no

## 2017-05-24 NOTE — PROGRESS NOTE ADULT - SUBJECTIVE AND OBJECTIVE BOX
SUBJECTIVE: Patient seen and examined.  No issues overnight. Pain well controlled. Denies CP/SOB    OBJECTIVE:     Vital Signs Last 24 Hrs  T(C): 37.9, Max: 37.9 ( @ 05:46)  T(F): 100.2, Max: 100.2 ( @ 05:46)  HR: 104 (82 - 107)  BP: 133/63 (91/46 - 145/62)  BP(mean): --  RR: 16 (14 - 18)  SpO2: 95% (93% - 99%)      LLE           Dressing: clean/dry/intact            Motor exam:  TA 5/5 EHL 5/5 GSC 5/5          Sensation:   T SILT SPH SILT DP SILT         Vascular:  Warm/pink/well perfused             LABS:                        9.2    11.7  )-----------( 169      ( 24 May 2017 06:44 )             27.2         131<L>  |  96  |  11  ----------------------------<  103<H>  4.3   |  24  |  0.60    Ca    7.9<L>      24 May 2017 06:44    TPro  7.5  /  Alb  4.0  /  TBili  0.4  /  DBili  x   /  AST  26  /  ALT  19  /  AlkPhos  98      PT/INR - ( 22 May 2017 15:25 )   PT: 11.4 sec;   INR: 1.03            Urinalysis Basic - ( 23 May 2017 00:55 )    Color: Yellow / Appearance: Clear / S.015 / pH: x  Gluc: x / Ketone: NEGATIVE  / Bili: NEGATIVE / Urobili: 0.2 E.U./dL   Blood: x / Protein: NEGATIVE mg/dL / Nitrite: NEGATIVE   Leuk Esterase: NEGATIVE / RBC: x / WBC x   Sq Epi: x / Non Sq Epi: x / Bacteria: x        MEDICATIONS:  MEDICATIONS  (STANDING):  docusate sodium 100milliGRAM(s) Oral three times a day  losartan 50milliGRAM(s) Oral daily  simvastatin 10milliGRAM(s) Oral at bedtime  ALBUTerol/ipratropium for Nebulization 3milliLiter(s) Nebulizer every 6 hours  ALBUTerol    90 MICROgram(s) HFA Inhaler 1Puff(s) Inhalation every 4 hours  tiotropium 18 MICROgram(s) Capsule 1Capsule(s) Inhalation daily  lactated ringers. 1000milliLiter(s) IV Continuous <Continuous>  aspirin enteric coated 325milliGRAM(s) Oral two times a day  ceFAZolin   IVPB 2000milliGRAM(s) IV Intermittent every 8 hours  polyethylene glycol 3350 17Gram(s) Oral daily  buDESOnide 160 MICROgram(s)/formoterol 4.5 MICROgram(s) Inhaler 2Puff(s) Inhalation two times a day    MEDICATIONS  (PRN):  morphine  - Injectable 2milliGRAM(s) IV Push every 4 hours PRN Moderate Pain if oral not effective  traMADol 50milliGRAM(s) Oral every 4 hours PRN Mild Pain (1 - 3)  acetaminophen   Tablet 650milliGRAM(s) Oral every 6 hours PRN For Temp over 38.3 C (100.94 F)  aluminum hydroxide/magnesium hydroxide/simethicone Suspension 30milliLiter(s) Oral four times a day PRN Indigestion  ondansetron Injectable 4milliGRAM(s) IV Push every 6 hours PRN Nausea and/or Vomiting  senna 2Tablet(s) Oral at bedtime PRN Constipation  morphine  - Injectable 2milliGRAM(s) IV Push every 15 minutes PRN PACU breakthrough pain      Anticoagulation:  aspirin enteric coated 325milliGRAM(s) Oral two times a day      Antibiotics:   ceFAZolin   IVPB 2000milliGRAM(s) IV Intermittent every 8 hours      Pain medications:   morphine  - Injectable 2milliGRAM(s) IV Push every 4 hours PRN  traMADol 50milliGRAM(s) Oral every 4 hours PRN  acetaminophen   Tablet 650milliGRAM(s) Oral every 6 hours PRN  ondansetron Injectable 4milliGRAM(s) IV Push every 6 hours PRN  morphine  - Injectable 2milliGRAM(s) IV Push every 15 minutes PRN        A/P :   s/p   L hip IMN for IT fracture POD #1  -    Pain control  -    DVT ppx: aspirin enteric coated 325milliGRAM(s) Oral two times a day  -    ADAT  -    Check AM labs  -    Weight bearing status:  WBAT  -    Resume home meds  -    Physical Therapy  -    Dispo: TBD

## 2017-05-24 NOTE — PROGRESS NOTE ADULT - SUBJECTIVE AND OBJECTIVE BOX
Interval Events:  Patient seen and examined at bedside.        MEDICATIONS:  Pulmonary:  ALBUTerol/ipratropium for Nebulization 3milliLiter(s) Nebulizer every 6 hours  ALBUTerol    90 MICROgram(s) HFA Inhaler 1Puff(s) Inhalation every 4 hours  tiotropium 18 MICROgram(s) Capsule 1Capsule(s) Inhalation daily  buDESOnide 160 MICROgram(s)/formoterol 4.5 MICROgram(s) Inhaler 2Puff(s) Inhalation two times a day    Antimicrobials:    Anticoagulants:  aspirin enteric coated 325milliGRAM(s) Oral two times a day    Cardiac:  losartan 50milliGRAM(s) Oral daily      Allergies    penicillins (Rash)    Intolerances        Vital Signs Last 24 Hrs  T(C): 37.5, Max: 37.9 (05-24 @ 05:46)  T(F): 99.5, Max: 100.2 (05-24 @ 05:46)  HR: 105 (82 - 107)  BP: 111/52 (91/46 - 145/62)  BP(mean): --  RR: 16 (14 - 18)  SpO2: 91% (91% - 99%)    I & Os for current day (as of 05-24 @ 11:13)  =============================================  IN: 1440 ml / OUT: 400 ml / NET: 1040 ml          LABS:      CBC Full  -  ( 24 May 2017 06:44 )  WBC Count : 11.7 K/uL  Hemoglobin : 9.2 g/dL  Hematocrit : 27.2 %  Platelet Count - Automated : 169 K/uL  Mean Cell Volume : 88.9 fL  Mean Cell Hemoglobin : 30.1 pg  Mean Cell Hemoglobin Concentration : 33.8 g/dL  Auto Neutrophil # : x  Auto Lymphocyte # : x  Auto Monocyte # : x  Auto Eosinophil # : x  Auto Basophil # : x  Auto Neutrophil % : x  Auto Lymphocyte % : x  Auto Monocyte % : x  Auto Eosinophil % : x  Auto Basophil % : x    05-24    131<L>  |  96  |  11  ----------------------------<  103<H>  4.3   |  24  |  0.60    Ca    7.9<L>      24 May 2017 06:44    TPro  7.5  /  Alb  4.0  /  TBili  0.4  /  DBili  x   /  AST  26  /  ALT  19  /  AlkPhos  98  05-22    PT/INR - ( 22 May 2017 15:25 )   PT: 11.4 sec;   INR: 1.03                          RADIOLOGY & ADDITIONAL STUDIES (The following images were personally reviewed): Interval Events:  Patient seen and examined at bedside.        MEDICATIONS:  Pulmonary:  ALBUTerol/ipratropium for Nebulization 3milliLiter(s) Nebulizer every 6 hours  ALBUTerol    90 MICROgram(s) HFA Inhaler 1Puff(s) Inhalation every 4 hours  tiotropium 18 MICROgram(s) Capsule 1Capsule(s) Inhalation daily  buDESOnide 160 MICROgram(s)/formoterol 4.5 MICROgram(s) Inhaler 2Puff(s) Inhalation two times a day    Antimicrobials:    Anticoagulants:  aspirin enteric coated 325milliGRAM(s) Oral two times a day    Cardiac:  losartan 50milliGRAM(s) Oral daily      Allergies    penicillins (Rash)    Intolerances        Vital Signs Last 24 Hrs  T(C): 37.5, Max: 37.9 (05-24 @ 05:46)  T(F): 99.5, Max: 100.2 (05-24 @ 05:46)  HR: 105 (82 - 107)  BP: 111/52 (91/46 - 145/62)  BP(mean): --  RR: 16 (14 - 18)  SpO2: 91% (91% - 99%)    I & Os for current day (as of 05-24 @ 11:13)  =============================================  IN: 1440 ml / OUT: 400 ml / NET: 1040 ml          LABS:      CBC Full  -  ( 24 May 2017 06:44 )  WBC Count : 11.7 K/uL  Hemoglobin : 9.2 g/dL  Hematocrit : 27.2 %  Platelet Count - Automated : 169 K/uL  Mean Cell Volume : 88.9 fL  Mean Cell Hemoglobin : 30.1 pg  Mean Cell Hemoglobin Concentration : 33.8 g/dL  Auto Neutrophil # : x  Auto Lymphocyte # : x  Auto Monocyte # : x  Auto Eosinophil # : x  Auto Basophil # : x  Auto Neutrophil % : x  Auto Lymphocyte % : x  Auto Monocyte % : x  Auto Eosinophil % : x  Auto Basophil % : x    05-24    131<L>  |  96  |  11  ----------------------------<  103<H>  4.3   |  24  |  0.60    Ca    7.9<L>      24 May 2017 06:44    TPro  7.5  /  Alb  4.0  /  TBili  0.4  /  DBili  x   /  AST  26  /  ALT  19  /  AlkPhos  98  05-22    PT/INR - ( 22 May 2017 15:25 )   PT: 11.4 sec;   INR: 1.03            RADIOLOGY & ADDITIONAL STUDIES (The following images were personally reviewed):

## 2017-05-24 NOTE — PROGRESS NOTE ADULT - SUBJECTIVE AND OBJECTIVE BOX
ORTHO NOTE    [x ] Pt seen/examined.  [ ] Pt without any complaints/in NAD.    [x ] Pt complains of: left hip pain, left rib pain      ROS: [ ] Fever  [ ] Chills  [ ] CP [ ] SOB [x ] Dysnea  [ ] Palpitations [ ] Cough [ ] N/V/C/D [ ] Paresthia [ ] Other     [ ] ROS  otherwise negative    .    PHYSICAL EXAM:    Vital Signs Last 24 Hrs  T(C): 37.5, Max: 37.9 (05-24 @ 05:46)  T(F): 99.5, Max: 100.2 (05-24 @ 05:46)  HR: 98 (82 - 107)  BP: 144/63 (91/46 - 145/62)  BP(mean): --  RR: 16 (14 - 18)  SpO2: 93% (91% - 99%)    I&O's Detail  I & Os for 24h ending 24 May 2017 07:00  =============================================  IN:    lactated ringers.: 750 ml    lactated ringers.: 640 ml    IV PiggyBack: 50 ml    Total IN: 1440 ml  ---------------------------------------------  OUT:    Voided: 300 ml    Indwelling Catheter - Urethral: 100 ml    Total OUT: 400 ml  ---------------------------------------------  Total NET: 1040 ml    I & Os for current day (as of 24 May 2017 13:34)  =============================================  IN:    lactated ringers.: 400 ml    IV PiggyBack: 50 ml    Total IN: 450 ml  ---------------------------------------------  OUT:    Total OUT: 0 ml  ---------------------------------------------  Total NET: 450 ml       CAPILLARY BLOOD GLUCOSE                  Neuro: AAOX3    Lungs: COPD desat 87% on 2L when dangling on side of bed, COPD meds and nebulizers administered, 2L NC    CV: sinus tachycardia HR 100s tele    ABD: soft, nontender    Ext: left hip dsg cdi, L LE NVID strength 5/5, ice applied to left hip    LABS                        9.2    11.7  )-----------( 169      ( 24 May 2017 06:44 )             27.2                              PT/INR - ( 22 May 2017 15:25 )   PT: 11.4 sec;   INR: 1.03            05-24    131<L>  |  96  |  11  ----------------------------<  103<H>  4.3   |  24  |  0.60    Ca    7.9<L>      24 May 2017 06:44    TPro  7.5  /  Alb  4.0  /  TBili  0.4  /  DBili  x   /  AST  26  /  ALT  19  /  AlkPhos  98  05-22      [ ] Other Labs  [ ] None ordered            Please check or [place] when present:  •  Heart Failure:    [ ] Acute        [ ]  Acute on Chronic        [ ] Chronic         [ ] Diastolic     [ ]  Combined    •  CODY:     [ ] ATN        [ ]  Renal medullary necrosis       [ ]  Renal cortical necrosis                  [ ] Other pathological Lesion:  •  CKD:  [ ] Stage I   [ ] Stage II  [ ] Stage III    [ ]Stage IV   [ ]  CKD V   [ ]  Other/Unspecified:    •  Abdominal Nutritional Status:   [ ] Malnutrition-See Nutrition note    [ ] Cachexia   [ ]  Other        [ ] Supplement ordered:            [ ] Morbid Obesity: BMI >=40         ASSESSMENT/PLAN:      STATUS POST: Left hip intramedullary nail pod1  H/H 9.2/27.2  1g tylenol for pain control  Family to bring in home Breo and Spiriva  Continue to attempt to dangle  NS@ 80  CONTINUE:          [ ] PT- WBAt    [ ] DVT PPX- ASA bid, scd boots    [ ] Pain Mgt- 1g IV tylenol, prn po meds    [ ] Dispo plan- JOHN

## 2017-05-24 NOTE — PHYSICAL THERAPY INITIAL EVALUATION ADULT - RANGE OF MOTION EXAMINATION, REHAB EVAL
Right LE ROM was WNL (within normal limits)/bilateral upper extremity ROM was WNL (within normal limits)/Left LE not tested

## 2017-05-24 NOTE — PHYSICAL THERAPY INITIAL EVALUATION ADULT - ADDITIONAL COMMENTS
Patient uses rollator outdoors and cane in the house. Patient uses rollator outdoors and cane in the house. has ramp to enter building

## 2017-05-25 PROCEDURE — 99233 SBSQ HOSP IP/OBS HIGH 50: CPT | Mod: GC

## 2017-05-25 PROCEDURE — 99232 SBSQ HOSP IP/OBS MODERATE 35: CPT | Mod: GC

## 2017-05-25 RX ORDER — KETOROLAC TROMETHAMINE 30 MG/ML
15 SYRINGE (ML) INJECTION EVERY 6 HOURS
Qty: 0 | Refills: 0 | Status: DISCONTINUED | OUTPATIENT
Start: 2017-05-25 | End: 2017-05-26

## 2017-05-25 RX ORDER — OXYCODONE HYDROCHLORIDE 5 MG/1
10 TABLET ORAL EVERY 4 HOURS
Qty: 0 | Refills: 0 | Status: DISCONTINUED | OUTPATIENT
Start: 2017-05-25 | End: 2017-05-30

## 2017-05-25 RX ORDER — OXYCODONE HYDROCHLORIDE 5 MG/1
5 TABLET ORAL EVERY 4 HOURS
Qty: 0 | Refills: 0 | Status: DISCONTINUED | OUTPATIENT
Start: 2017-05-25 | End: 2017-05-30

## 2017-05-25 RX ORDER — TIOTROPIUM BROMIDE 18 UG/1
1 CAPSULE ORAL; RESPIRATORY (INHALATION) DAILY
Qty: 0 | Refills: 0 | Status: DISCONTINUED | OUTPATIENT
Start: 2017-05-25 | End: 2017-05-30

## 2017-05-25 RX ADMIN — Medication 975 MILLIGRAM(S): at 07:15

## 2017-05-25 RX ADMIN — OXYCODONE HYDROCHLORIDE 5 MILLIGRAM(S): 5 TABLET ORAL at 09:03

## 2017-05-25 RX ADMIN — Medication 975 MILLIGRAM(S): at 21:03

## 2017-05-25 RX ADMIN — Medication 15 MILLIGRAM(S): at 11:16

## 2017-05-25 RX ADMIN — OXYCODONE HYDROCHLORIDE 5 MILLIGRAM(S): 5 TABLET ORAL at 05:25

## 2017-05-25 RX ADMIN — TIOTROPIUM BROMIDE 1 CAPSULE(S): 18 CAPSULE ORAL; RESPIRATORY (INHALATION) at 18:19

## 2017-05-25 RX ADMIN — Medication 100 MILLIGRAM(S): at 21:03

## 2017-05-25 RX ADMIN — Medication 975 MILLIGRAM(S): at 15:40

## 2017-05-25 RX ADMIN — OXYCODONE HYDROCHLORIDE 5 MILLIGRAM(S): 5 TABLET ORAL at 04:46

## 2017-05-25 RX ADMIN — Medication 325 MILLIGRAM(S): at 06:27

## 2017-05-25 RX ADMIN — LIDOCAINE 1 PATCH: 4 CREAM TOPICAL at 23:31

## 2017-05-25 RX ADMIN — Medication 15 MILLIGRAM(S): at 18:30

## 2017-05-25 RX ADMIN — CELECOXIB 200 MILLIGRAM(S): 200 CAPSULE ORAL at 18:11

## 2017-05-25 RX ADMIN — BUDESONIDE AND FORMOTEROL FUMARATE DIHYDRATE 2 PUFF(S): 160; 4.5 AEROSOL RESPIRATORY (INHALATION) at 18:11

## 2017-05-25 RX ADMIN — Medication 3 MILLILITER(S): at 18:11

## 2017-05-25 RX ADMIN — SODIUM CHLORIDE 80 MILLILITER(S): 9 INJECTION INTRAMUSCULAR; INTRAVENOUS; SUBCUTANEOUS at 14:39

## 2017-05-25 RX ADMIN — Medication 5 MILLIGRAM(S): at 18:17

## 2017-05-25 RX ADMIN — Medication 3 MILLILITER(S): at 06:27

## 2017-05-25 RX ADMIN — Medication 15 MILLIGRAM(S): at 11:01

## 2017-05-25 RX ADMIN — OXYCODONE HYDROCHLORIDE 10 MILLIGRAM(S): 5 TABLET ORAL at 14:41

## 2017-05-25 RX ADMIN — Medication 100 MILLIGRAM(S): at 06:27

## 2017-05-25 RX ADMIN — Medication 325 MILLIGRAM(S): at 18:11

## 2017-05-25 RX ADMIN — CELECOXIB 200 MILLIGRAM(S): 200 CAPSULE ORAL at 10:03

## 2017-05-25 RX ADMIN — LIDOCAINE 1 PATCH: 4 CREAM TOPICAL at 00:00

## 2017-05-25 RX ADMIN — CELECOXIB 200 MILLIGRAM(S): 200 CAPSULE ORAL at 09:03

## 2017-05-25 RX ADMIN — Medication 975 MILLIGRAM(S): at 21:02

## 2017-05-25 RX ADMIN — Medication 3 MILLILITER(S): at 11:05

## 2017-05-25 RX ADMIN — OXYCODONE HYDROCHLORIDE 5 MILLIGRAM(S): 5 TABLET ORAL at 10:03

## 2017-05-25 RX ADMIN — OXYCODONE HYDROCHLORIDE 10 MILLIGRAM(S): 5 TABLET ORAL at 15:41

## 2017-05-25 RX ADMIN — Medication 3 MILLILITER(S): at 00:13

## 2017-05-25 RX ADMIN — Medication 975 MILLIGRAM(S): at 14:40

## 2017-05-25 RX ADMIN — LOSARTAN POTASSIUM 50 MILLIGRAM(S): 100 TABLET, FILM COATED ORAL at 06:27

## 2017-05-25 RX ADMIN — Medication 100 MILLIGRAM(S): at 14:40

## 2017-05-25 RX ADMIN — POLYETHYLENE GLYCOL 3350 17 GRAM(S): 17 POWDER, FOR SOLUTION ORAL at 11:04

## 2017-05-25 RX ADMIN — CELECOXIB 200 MILLIGRAM(S): 200 CAPSULE ORAL at 19:11

## 2017-05-25 RX ADMIN — Medication 15 MILLIGRAM(S): at 18:15

## 2017-05-25 RX ADMIN — Medication 975 MILLIGRAM(S): at 06:29

## 2017-05-25 RX ADMIN — SIMVASTATIN 10 MILLIGRAM(S): 20 TABLET, FILM COATED ORAL at 21:02

## 2017-05-25 RX ADMIN — LIDOCAINE 1 PATCH: 4 CREAM TOPICAL at 11:09

## 2017-05-25 RX ADMIN — BUDESONIDE AND FORMOTEROL FUMARATE DIHYDRATE 2 PUFF(S): 160; 4.5 AEROSOL RESPIRATORY (INHALATION) at 06:27

## 2017-05-25 NOTE — PROGRESS NOTE ADULT - SUBJECTIVE AND OBJECTIVE BOX
ORTHO NOTE    [x ] Pt seen/examined.  [ ] Pt without any complaints/in NAD.    [x ] Pt complains of: left hip pain, rib pain      ROS: [ ] Fever  [ ] Chills  [ ] CP [x ] SOB [ ] Dysnea  [ ] Palpitations [ ] Cough [ ] N/V/C/D [ ] Paresthia [ ] Other     [ ] ROS  otherwise negative    .Patient reports this is her baseline lung function    PHYSICAL EXAM:    Vital Signs Last 24 Hrs  T(C): 36.4, Max: 37.6 (05-24 @ 20:34)  T(F): 97.6, Max: 99.6 (05-24 @ 20:34)  HR: 100 (80 - 112)  BP: 108/54 (97/50 - 144/63)  BP(mean): --  RR: 18 (15 - 20)  SpO2: 100% (93% - 100%)    I&O's Detail    I & Os for current day (as of 25 May 2017 11:39)  =============================================  IN:    sodium chloride 0.9%.: 1440 ml    lactated ringers.: 500 ml    Oral Fluid: 240 ml    IV PiggyBack: 150 ml    Total IN: 2330 ml  ---------------------------------------------  OUT:    Indwelling Catheter - Urethral: 1600 ml    Total OUT: 1600 ml  ---------------------------------------------  Total NET: 730 ml       CAPILLARY BLOOD GLUCOSE                  Neuro: AAOX3    Lungs: CTA, no accessory muscles in use, Spo2 100% o 2L NC, continue nebulizers and COPD meds, IS demonstrated    CV: HR 100s    ABD: soft, nontender, bowel regiment    Ext: left hip dsg cdi, left LE NVID, +edema wnl, left LE strength 5/5    LABS                        9.2    11.7  )-----------( 169      ( 24 May 2017 06:44 )             27.2                                05-24    131<L>  |  96  |  11  ----------------------------<  103<H>  4.3   |  24  |  0.60    Ca    7.9<L>      24 May 2017 06:44        [ ] Other Labs  [ ] None ordered            Please check or [place] when present:  •  Heart Failure:    [ ] Acute        [ ]  Acute on Chronic        [ ] Chronic         [ ] Diastolic     [ ]  Combined    •  CODY:     [ ] ATN        [ ]  Renal medullary necrosis       [ ]  Renal cortical necrosis                  [ ] Other pathological Lesion:  •  CKD:  [ ] Stage I   [ ] Stage II  [ ] Stage III    [ ]Stage IV   [ ]  CKD V   [ ]  Other/Unspecified:    •  Abdominal Nutritional Status:   [ ] Malnutrition-See Nutrition note    [ ] Cachexia   [ ]  Other        [ ] Supplement ordered:            [ ] Morbid Obesity: BMI >=40         ASSESSMENT/PLAN:      STATUS POST: left hip IM Nail   Pulmonary c/s- continue nebs and home COPD meds, push IS and movement  d/c sri NAVARRO  CONTINUE:          [ ] PT- WBAT    [ ] DVT PPX- ASA bid, scd boots    [ ] Pain Mgt- po meds, toradol added for additional pain control    [ ] Dispo plan- JOHN

## 2017-05-25 NOTE — PROGRESS NOTE ADULT - SUBJECTIVE AND OBJECTIVE BOX
Interval Events:  Patient seen and examined at bedside.  JORDI        MEDICATIONS:  Pulmonary:  ALBUTerol/ipratropium for Nebulization 3milliLiter(s) Nebulizer every 6 hours  ALBUTerol    90 MICROgram(s) HFA Inhaler 1Puff(s) Inhalation every 4 hours  tiotropium 18 MICROgram(s) Capsule 1Capsule(s) Inhalation daily  buDESOnide 160 MICROgram(s)/formoterol 4.5 MICROgram(s) Inhaler 2Puff(s) Inhalation two times a day    Antimicrobials:    Anticoagulants:  aspirin enteric coated 325milliGRAM(s) Oral two times a day    Cardiac:  losartan 50milliGRAM(s) Oral daily      Allergies    penicillins (Rash)    Intolerances        Vital Signs Last 24 Hrs  T(C): 36.7, Max: 37.6 (05-24 @ 20:34)  T(F): 98, Max: 99.6 (05-24 @ 20:34)  HR: 112 (80 - 112)  BP: 131/68 (97/50 - 144/63)  BP(mean): --  RR: 18 (15 - 18)  SpO2: 99% (93% - 100%)    I & Os for current day (as of 05-25 @ 11:13)  =============================================  IN: 2330 ml / OUT: 1600 ml / NET: 730 ml          LABS:      CBC Full  -  ( 24 May 2017 06:44 )  WBC Count : 11.7 K/uL  Hemoglobin : 9.2 g/dL  Hematocrit : 27.2 %  Platelet Count - Automated : 169 K/uL  Mean Cell Volume : 88.9 fL  Mean Cell Hemoglobin : 30.1 pg  Mean Cell Hemoglobin Concentration : 33.8 g/dL  Auto Neutrophil # : x  Auto Lymphocyte # : x  Auto Monocyte # : x  Auto Eosinophil # : x  Auto Basophil # : x  Auto Neutrophil % : x  Auto Lymphocyte % : x  Auto Monocyte % : x  Auto Eosinophil % : x  Auto Basophil % : x    05-24    131<L>  |  96  |  11  ----------------------------<  103<H>  4.3   |  24  |  0.60    Ca    7.9<L>      24 May 2017 06:44                      RADIOLOGY & ADDITIONAL STUDIES (The following images were personally reviewed): Interval Events:  Patient seen and examined at bedside.  JORDI        MEDICATIONS:  Pulmonary:  ALBUTerol/ipratropium for Nebulization 3milliLiter(s) Nebulizer every 6 hours  ALBUTerol    90 MICROgram(s) HFA Inhaler 1Puff(s) Inhalation every 4 hours  tiotropium 18 MICROgram(s) Capsule 1Capsule(s) Inhalation daily  buDESOnide 160 MICROgram(s)/formoterol 4.5 MICROgram(s) Inhaler 2Puff(s) Inhalation two times a day    Antimicrobials:    Anticoagulants:  aspirin enteric coated 325milliGRAM(s) Oral two times a day    Cardiac:  losartan 50milliGRAM(s) Oral daily      Allergies    penicillins (Rash)    Intolerances        Vital Signs Last 24 Hrs  T(C): 36.7, Max: 37.6 (05-24 @ 20:34)  T(F): 98, Max: 99.6 (05-24 @ 20:34)  HR: 112 (80 - 112)  BP: 131/68 (97/50 - 144/63)  BP(mean): --  RR: 18 (15 - 18)  SpO2: 99% (93% - 100%)    I & Os for current day (as of 05-25 @ 11:13)  =============================================  IN: 2330 ml / OUT: 1600 ml / NET: 730 ml          LABS:      CBC Full  -  ( 24 May 2017 06:44 )  WBC Count : 11.7 K/uL  Hemoglobin : 9.2 g/dL  Hematocrit : 27.2 %  Platelet Count - Automated : 169 K/uL  Mean Cell Volume : 88.9 fL  Mean Cell Hemoglobin : 30.1 pg  Mean Cell Hemoglobin Concentration : 33.8 g/dL  Auto Neutrophil # : x  Auto Lymphocyte # : x  Auto Monocyte # : x  Auto Eosinophil # : x  Auto Basophil # : x  Auto Neutrophil % : x  Auto Lymphocyte % : x  Auto Monocyte % : x  Auto Eosinophil % : x  Auto Basophil % : x    05-24    131<L>  |  96  |  11  ----------------------------<  103<H>  4.3   |  24  |  0.60    Ca    7.9<L>      24 May 2017 06:44          RADIOLOGY & ADDITIONAL STUDIES (The following images were personally reviewed):

## 2017-05-25 NOTE — PROGRESS NOTE ADULT - SUBJECTIVE AND OBJECTIVE BOX
SUBJECTIVE: Patient seen and examined. + SOB, pain well controlled. Pt has baseline SOB, states it's not worse than baseline.    OBJECTIVE:     Vital Signs Last 24 Hrs  T(C): 36.7, Max: 37.6 (05-24 @ 20:34)  T(F): 98, Max: 99.6 (05-24 @ 20:34)  HR: 112 (80 - 112)  BP: 131/68 (97/50 - 144/63)  BP(mean): --  RR: 18 (15 - 18)  SpO2: 99% (93% - 100%)        RLE             Dressing: clean/dry/intact            Motor exam:  TA 5/5 EHL 5/5 GSC 5/5          Sensation:   T SILT SPH SILT DP SILT         Vascular:  Warm/pink/well perfused             LABS:                        9.2    11.7  )-----------( 169      ( 24 May 2017 06:44 )             27.2     05-24    131<L>  |  96  |  11  ----------------------------<  103<H>  4.3   |  24  |  0.60    Ca    7.9<L>      24 May 2017 06:44            MEDICATIONS:  MEDICATIONS  (STANDING):  docusate sodium 100milliGRAM(s) Oral three times a day  losartan 50milliGRAM(s) Oral daily  simvastatin 10milliGRAM(s) Oral at bedtime  ALBUTerol/ipratropium for Nebulization 3milliLiter(s) Nebulizer every 6 hours  ALBUTerol    90 MICROgram(s) HFA Inhaler 1Puff(s) Inhalation every 4 hours  tiotropium 18 MICROgram(s) Capsule 1Capsule(s) Inhalation daily  aspirin enteric coated 325milliGRAM(s) Oral two times a day  polyethylene glycol 3350 17Gram(s) Oral daily  buDESOnide 160 MICROgram(s)/formoterol 4.5 MICROgram(s) Inhaler 2Puff(s) Inhalation two times a day  acetaminophen   Tablet. 975milliGRAM(s) Oral every 8 hours  lidocaine   Patch 1Patch Transdermal daily  sodium chloride 0.9%. 1000milliLiter(s) IV Continuous <Continuous>  celecoxib 200milliGRAM(s) Oral two times a day with meals    MEDICATIONS  (PRN):  traMADol 50milliGRAM(s) Oral every 4 hours PRN Mild Pain (1 - 3)  acetaminophen   Tablet 650milliGRAM(s) Oral every 6 hours PRN For Temp over 38.3 C (100.94 F)  aluminum hydroxide/magnesium hydroxide/simethicone Suspension 30milliLiter(s) Oral four times a day PRN Indigestion  ondansetron Injectable 4milliGRAM(s) IV Push every 6 hours PRN Nausea and/or Vomiting  bisacodyl Suppository 10milliGRAM(s) Rectal daily PRN If no bowel movement by POD#2  senna 2Tablet(s) Oral at bedtime PRN Constipation  oxyCODONE IR 5milliGRAM(s) Oral every 4 hours PRN Severe Pain (7 - 10)      Anticoagulation:  aspirin enteric coated 325milliGRAM(s) Oral two times a day      Antibiotics:       Pain medications:   traMADol 50milliGRAM(s) Oral every 4 hours PRN  acetaminophen   Tablet 650milliGRAM(s) Oral every 6 hours PRN  ondansetron Injectable 4milliGRAM(s) IV Push every 6 hours PRN  acetaminophen   Tablet. 975milliGRAM(s) Oral every 8 hours  oxyCODONE IR 5milliGRAM(s) Oral every 4 hours PRN  celecoxib 200milliGRAM(s) Oral two times a day with meals        A/P :   s/p   L Hip IMN POD#2  -    Pain control  -    DVT ppx: aspirin enteric coated 325milliGRAM(s) Oral two times a day  -    Check AM labs  -    Weight bearing status:  WBAT  -    Physical Therapy  -    Dispo: Rehab  -    SOB: Currently saturating 100% on RA, if develops desaturations will w/u with further studies

## 2017-05-25 NOTE — PROGRESS NOTE ADULT - SUBJECTIVE AND OBJECTIVE BOX
Interval Events: reviewed  Patient seen and examined at bedside.    Patient is a 81y old  Female who presents with a chief complaint of left hip pain, left rib pain (22 May 2017 17:20)    The patient is d be depressed. She did physical therapy she walked three steps both ways.She did not have bowel movement. The pain is controlled.  PAST MEDICAL & SURGICAL HISTORY:      MEDICATIONS:  Pulmonary:  ALBUTerol/ipratropium for Nebulization 3milliLiter(s) Nebulizer every 6 hours  ALBUTerol    90 MICROgram(s) HFA Inhaler 1Puff(s) Inhalation every 4 hours  buDESOnide 160 MICROgram(s)/formoterol 4.5 MICROgram(s) Inhaler 2Puff(s) Inhalation two times a day  tiotropium 18 MICROgram(s) Capsule 1Capsule(s) Inhalation daily    Antimicrobials:    Anticoagulants:  aspirin enteric coated 325milliGRAM(s) Oral two times a day    Cardiac:  losartan 50milliGRAM(s) Oral daily      Allergies    penicillins (Rash)    Intolerances        Vital Signs Last 24 Hrs  T(C): 36.7, Max: 36.8 (05-25 @ 00:11)  T(F): 98.1, Max: 98.3 (05-25 @ 13:25)  HR: 97 (80 - 112)  BP: 122/55 (97/50 - 133/62)  BP(mean): --  RR: 20 (16 - 22)  SpO2: 99% (96% - 100%)  I & Os for 24h ending 05-25 @ 07:00  =============================================  IN: 2330 ml / OUT: 1600 ml / NET: 730 ml    I & Os for current day (as of 05-25 @ 22:21)  =============================================  IN: 2140 ml / OUT: 250 ml / NET: 1890 ml        LABS:      CBC Full  -  ( 24 May 2017 06:44 )  WBC Count : 11.7 K/uL  Hemoglobin : 9.2 g/dL  Hematocrit : 27.2 %  Platelet Count - Automated : 169 K/uL  Mean Cell Volume : 88.9 fL  Mean Cell Hemoglobin : 30.1 pg  Mean Cell Hemoglobin Concentration : 33.8 g/dL  Auto Neutrophil # : x  Auto Lymphocyte # : x  Auto Monocyte # : x  Auto Eosinophil # : x  Auto Basophil # : x  Auto Neutrophil % : x  Auto Lymphocyte % : x  Auto Monocyte % : x  Auto Eosinophil % : x  Auto Basophil % : x    05-24    131<L>  |  96  |  11  ----------------------------<  103<H>  4.3   |  24  |  0.60    Ca    7.9<L>      24 May 2017 06:44                          RADIOLOGY & ADDITIONAL STUDIES (The following images were personally reviewed):  [place]:                           No  Urine output:               Yes          DVT prophylaxis:         Yes          Flattus:                          Yes       Bowel movement:           No

## 2017-05-26 ENCOUNTER — TRANSCRIPTION ENCOUNTER (OUTPATIENT)
Age: 82
End: 2017-05-26

## 2017-05-26 ENCOUNTER — APPOINTMENT (OUTPATIENT)
Dept: PULMONOLOGY | Facility: CLINIC | Age: 82
End: 2017-05-26

## 2017-05-26 LAB
ANION GAP SERPL CALC-SCNC: 10 MMOL/L — SIGNIFICANT CHANGE UP (ref 5–17)
BUN SERPL-MCNC: 14 MG/DL — SIGNIFICANT CHANGE UP (ref 7–23)
CALCIUM SERPL-MCNC: 8 MG/DL — LOW (ref 8.4–10.5)
CHLORIDE SERPL-SCNC: 96 MMOL/L — SIGNIFICANT CHANGE UP (ref 96–108)
CO2 SERPL-SCNC: 24 MMOL/L — SIGNIFICANT CHANGE UP (ref 22–31)
CREAT SERPL-MCNC: 0.6 MG/DL — SIGNIFICANT CHANGE UP (ref 0.5–1.3)
GLUCOSE SERPL-MCNC: 116 MG/DL — HIGH (ref 70–99)
HCT VFR BLD CALC: 23.5 % — LOW (ref 34.5–45)
HGB BLD-MCNC: 8.2 G/DL — LOW (ref 11.5–15.5)
MCHC RBC-ENTMCNC: 30.5 PG — SIGNIFICANT CHANGE UP (ref 27–34)
MCHC RBC-ENTMCNC: 34.9 G/DL — SIGNIFICANT CHANGE UP (ref 32–36)
MCV RBC AUTO: 87.4 FL — SIGNIFICANT CHANGE UP (ref 80–100)
PLATELET # BLD AUTO: 166 K/UL — SIGNIFICANT CHANGE UP (ref 150–400)
POTASSIUM SERPL-MCNC: 4.4 MMOL/L — SIGNIFICANT CHANGE UP (ref 3.5–5.3)
POTASSIUM SERPL-SCNC: 4.4 MMOL/L — SIGNIFICANT CHANGE UP (ref 3.5–5.3)
RBC # BLD: 2.69 M/UL — LOW (ref 3.8–5.2)
RBC # FLD: 14.4 % — SIGNIFICANT CHANGE UP (ref 10.3–16.9)
SODIUM SERPL-SCNC: 130 MMOL/L — LOW (ref 135–145)
WBC # BLD: 8.4 K/UL — SIGNIFICANT CHANGE UP (ref 3.8–10.5)
WBC # FLD AUTO: 8.4 K/UL — SIGNIFICANT CHANGE UP (ref 3.8–10.5)

## 2017-05-26 PROCEDURE — 71010: CPT | Mod: 26

## 2017-05-26 PROCEDURE — 99232 SBSQ HOSP IP/OBS MODERATE 35: CPT | Mod: GC

## 2017-05-26 RX ORDER — LACTULOSE 10 G/15ML
10 SOLUTION ORAL ONCE
Qty: 0 | Refills: 0 | Status: COMPLETED | OUTPATIENT
Start: 2017-05-26 | End: 2017-05-26

## 2017-05-26 RX ORDER — LOSARTAN POTASSIUM 100 MG/1
1 TABLET, FILM COATED ORAL
Qty: 0 | Refills: 0 | COMMUNITY
Start: 2017-05-26

## 2017-05-26 RX ORDER — TIOTROPIUM BROMIDE 18 UG/1
1 CAPSULE ORAL; RESPIRATORY (INHALATION)
Qty: 0 | Refills: 0 | COMMUNITY
Start: 2017-05-26

## 2017-05-26 RX ORDER — OXYCODONE HYDROCHLORIDE 5 MG/1
1 TABLET ORAL
Qty: 0 | Refills: 0 | COMMUNITY
Start: 2017-05-26

## 2017-05-26 RX ORDER — LIDOCAINE 4 G/100G
0 CREAM TOPICAL
Qty: 0 | Refills: 0 | COMMUNITY
Start: 2017-05-26

## 2017-05-26 RX ORDER — POLYETHYLENE GLYCOL 3350 17 G/17G
17 POWDER, FOR SOLUTION ORAL
Qty: 0 | Refills: 0 | COMMUNITY
Start: 2017-05-26

## 2017-05-26 RX ORDER — BUDESONIDE AND FORMOTEROL FUMARATE DIHYDRATE 160; 4.5 UG/1; UG/1
0 AEROSOL RESPIRATORY (INHALATION)
Qty: 0 | Refills: 0 | COMMUNITY
Start: 2017-05-26

## 2017-05-26 RX ORDER — ACETAMINOPHEN 500 MG
3 TABLET ORAL
Qty: 0 | Refills: 0 | COMMUNITY
Start: 2017-05-26

## 2017-05-26 RX ORDER — ALPRAZOLAM 0.25 MG
0.25 TABLET ORAL EVERY 12 HOURS
Qty: 0 | Refills: 0 | Status: DISCONTINUED | OUTPATIENT
Start: 2017-05-26 | End: 2017-05-30

## 2017-05-26 RX ORDER — CELECOXIB 200 MG/1
1 CAPSULE ORAL
Qty: 0 | Refills: 0 | COMMUNITY
Start: 2017-05-26

## 2017-05-26 RX ORDER — IPRATROPIUM/ALBUTEROL SULFATE 18-103MCG
3 AEROSOL WITH ADAPTER (GRAM) INHALATION
Qty: 0 | Refills: 0 | COMMUNITY
Start: 2017-05-26

## 2017-05-26 RX ORDER — SENNA PLUS 8.6 MG/1
2 TABLET ORAL
Qty: 0 | Refills: 0 | COMMUNITY
Start: 2017-05-26

## 2017-05-26 RX ORDER — DOCUSATE SODIUM 100 MG
1 CAPSULE ORAL
Qty: 0 | Refills: 0 | COMMUNITY
Start: 2017-05-26

## 2017-05-26 RX ORDER — ASPIRIN/CALCIUM CARB/MAGNESIUM 324 MG
1 TABLET ORAL
Qty: 0 | Refills: 0 | COMMUNITY
Start: 2017-05-26

## 2017-05-26 RX ORDER — SIMVASTATIN 20 MG/1
1 TABLET, FILM COATED ORAL
Qty: 0 | Refills: 0 | COMMUNITY
Start: 2017-05-26

## 2017-05-26 RX ADMIN — CELECOXIB 200 MILLIGRAM(S): 200 CAPSULE ORAL at 07:09

## 2017-05-26 RX ADMIN — Medication 975 MILLIGRAM(S): at 15:53

## 2017-05-26 RX ADMIN — OXYCODONE HYDROCHLORIDE 10 MILLIGRAM(S): 5 TABLET ORAL at 20:58

## 2017-05-26 RX ADMIN — Medication 3 MILLILITER(S): at 17:31

## 2017-05-26 RX ADMIN — Medication 975 MILLIGRAM(S): at 06:51

## 2017-05-26 RX ADMIN — Medication 15 MILLIGRAM(S): at 00:07

## 2017-05-26 RX ADMIN — Medication 15 MILLIGRAM(S): at 06:50

## 2017-05-26 RX ADMIN — Medication 1 ENEMA: at 17:46

## 2017-05-26 RX ADMIN — Medication 325 MILLIGRAM(S): at 17:30

## 2017-05-26 RX ADMIN — Medication 100 MILLIGRAM(S): at 21:00

## 2017-05-26 RX ADMIN — Medication 100 MILLIGRAM(S): at 06:51

## 2017-05-26 RX ADMIN — POLYETHYLENE GLYCOL 3350 17 GRAM(S): 17 POWDER, FOR SOLUTION ORAL at 12:13

## 2017-05-26 RX ADMIN — Medication 15 MILLIGRAM(S): at 12:21

## 2017-05-26 RX ADMIN — BUDESONIDE AND FORMOTEROL FUMARATE DIHYDRATE 2 PUFF(S): 160; 4.5 AEROSOL RESPIRATORY (INHALATION) at 17:31

## 2017-05-26 RX ADMIN — Medication 15 MILLIGRAM(S): at 12:06

## 2017-05-26 RX ADMIN — Medication 3 MILLILITER(S): at 00:00

## 2017-05-26 RX ADMIN — LACTULOSE 10 GRAM(S): 10 SOLUTION ORAL at 09:27

## 2017-05-26 RX ADMIN — OXYCODONE HYDROCHLORIDE 10 MILLIGRAM(S): 5 TABLET ORAL at 14:54

## 2017-05-26 RX ADMIN — Medication 10 MILLIGRAM(S): at 12:37

## 2017-05-26 RX ADMIN — LIDOCAINE 1 PATCH: 4 CREAM TOPICAL at 12:14

## 2017-05-26 RX ADMIN — CELECOXIB 200 MILLIGRAM(S): 200 CAPSULE ORAL at 18:30

## 2017-05-26 RX ADMIN — Medication 975 MILLIGRAM(S): at 21:40

## 2017-05-26 RX ADMIN — OXYCODONE HYDROCHLORIDE 10 MILLIGRAM(S): 5 TABLET ORAL at 04:37

## 2017-05-26 RX ADMIN — Medication 325 MILLIGRAM(S): at 06:51

## 2017-05-26 RX ADMIN — CELECOXIB 200 MILLIGRAM(S): 200 CAPSULE ORAL at 17:30

## 2017-05-26 RX ADMIN — OXYCODONE HYDROCHLORIDE 10 MILLIGRAM(S): 5 TABLET ORAL at 05:10

## 2017-05-26 RX ADMIN — Medication 15 MILLIGRAM(S): at 17:45

## 2017-05-26 RX ADMIN — Medication 15 MILLIGRAM(S): at 06:51

## 2017-05-26 RX ADMIN — TIOTROPIUM BROMIDE 1 CAPSULE(S): 18 CAPSULE ORAL; RESPIRATORY (INHALATION) at 12:13

## 2017-05-26 RX ADMIN — Medication 100 MILLIGRAM(S): at 14:53

## 2017-05-26 RX ADMIN — Medication 15 MILLIGRAM(S): at 17:30

## 2017-05-26 RX ADMIN — LOSARTAN POTASSIUM 50 MILLIGRAM(S): 100 TABLET, FILM COATED ORAL at 06:51

## 2017-05-26 RX ADMIN — Medication 15 MILLIGRAM(S): at 00:08

## 2017-05-26 RX ADMIN — Medication 3 MILLILITER(S): at 06:51

## 2017-05-26 RX ADMIN — SIMVASTATIN 10 MILLIGRAM(S): 20 TABLET, FILM COATED ORAL at 21:01

## 2017-05-26 RX ADMIN — BUDESONIDE AND FORMOTEROL FUMARATE DIHYDRATE 2 PUFF(S): 160; 4.5 AEROSOL RESPIRATORY (INHALATION) at 06:50

## 2017-05-26 RX ADMIN — Medication 975 MILLIGRAM(S): at 21:01

## 2017-05-26 RX ADMIN — OXYCODONE HYDROCHLORIDE 10 MILLIGRAM(S): 5 TABLET ORAL at 15:54

## 2017-05-26 RX ADMIN — Medication 3 MILLILITER(S): at 12:13

## 2017-05-26 RX ADMIN — OXYCODONE HYDROCHLORIDE 10 MILLIGRAM(S): 5 TABLET ORAL at 21:40

## 2017-05-26 RX ADMIN — Medication 975 MILLIGRAM(S): at 14:53

## 2017-05-26 NOTE — DISCHARGE NOTE ADULT - CARE PROVIDER_API CALL
Alber Garner), Orthopaedic Surgery Surgery  [address] [address]  Phone: [phone]  Fax: [phone]    Livia Dominguez), Critical Care Medicine; Pulmonary Disease  [address] [address]  Phone: [phone]  Fax: [phone]

## 2017-05-26 NOTE — PROGRESS NOTE ADULT - SUBJECTIVE AND OBJECTIVE BOX
ORTHO NOTE    [x ] Pt seen/examined.  [ ] Pt without any complaints/in NAD.    [x ] Pt complains of: left hip pain      ROS: [ ] Fever  [ ] Chills  [ ] CP [x ] SOB [ ] Dysnea  [ ] Palpitations [ ] Cough [ ] N/V/C/D [ ] Paresthia [ ] Other     [ ] ROS  otherwise negative    .    PHYSICAL EXAM:    Vital Signs Last 24 Hrs  T(C): 36.9, Max: 37.7 (05-26 @ 00:37)  T(F): 98.5, Max: 99.8 (05-26 @ 00:37)  HR: 102 (90 - 112)  BP: 124/58 (100/52 - 158/70)  BP(mean): --  RR: 20 (18 - 22)  SpO2: 100% (96% - 100%)    I&O's Detail    I & Os for current day (as of 26 May 2017 11:17)  =============================================  IN:    Oral Fluid: 1180 ml    sodium chloride 0.9%.: 960 ml    Total IN: 2140 ml  ---------------------------------------------  OUT:    Indwelling Catheter - Urethral: 1275 ml    Total OUT: 1275 ml  ---------------------------------------------  Total NET: 865 ml       CAPILLARY BLOOD GLUCOSE                  Neuro: AAOX3    Lungs: COPD meds/nebulizers, 2L NC prn    CV: HR 100s    ABD: soft, nontender, encourage BM    Ext: left hip dressing stained but contained in ALANNA nelson, strength 5/5    LABS                        8.2    8.4   )-----------( 166      ( 26 May 2017 10:57 )             23.5                                        [ ] Other Labs  [ ] None ordered            Please check or [place] when present:  •  Heart Failure:    [ ] Acute        [ ]  Acute on Chronic        [ ] Chronic         [ ] Diastolic     [ ]  Combined    •  CODY:     [ ] ATN        [ ]  Renal medullary necrosis       [ ]  Renal cortical necrosis                  [ ] Other pathological Lesion:  •  CKD:  [ ] Stage I   [ ] Stage II  [ ] Stage III    [ ]Stage IV   [ ]  CKD V   [ ]  Other/Unspecified:    •  Abdominal Nutritional Status:   [ ] Malnutrition-See Nutrition note    [ ] Cachexia   [ ]  Other        [ ] Supplement ordered:            [ ] Morbid Obesity: BMI >=40         ASSESSMENT/PLAN:      STATUS POST: left hip IMN  Patient failed TOV and fierro reinserted on night shift.  Encourage BM today and more ambulation before another TOV.  Pushing IS and dangling at side of bed    CONTINUE:          [ ] PT- WBAT    [ ] DVT PPX- ASA bid, scd boots    [ ] Pain Mgt- po meds    [ ] Dispo plan- JOHN

## 2017-05-26 NOTE — PROGRESS NOTE ADULT - SUBJECTIVE AND OBJECTIVE BOX
Interval Events:  Patient seen and examined at bedside.        MEDICATIONS:  Pulmonary:  ALBUTerol/ipratropium for Nebulization 3milliLiter(s) Nebulizer every 6 hours  ALBUTerol    90 MICROgram(s) HFA Inhaler 1Puff(s) Inhalation every 4 hours  buDESOnide 160 MICROgram(s)/formoterol 4.5 MICROgram(s) Inhaler 2Puff(s) Inhalation two times a day  tiotropium 18 MICROgram(s) Capsule 1Capsule(s) Inhalation daily    Antimicrobials:    Anticoagulants:  aspirin enteric coated 325milliGRAM(s) Oral two times a day    Cardiac:  losartan 50milliGRAM(s) Oral daily      Allergies    penicillins (Rash)    Intolerances        Vital Signs Last 24 Hrs  T(C): 36.9, Max: 37.7 (05-26 @ 00:37)  T(F): 98.5, Max: 99.8 (05-26 @ 00:37)  HR: 102 (90 - 109)  BP: 124/58 (100/52 - 158/70)  BP(mean): --  RR: 20 (19 - 22)  SpO2: 100% (96% - 100%)    I & Os for current day (as of 05-26 @ 11:51)  =============================================  IN: 2140 ml / OUT: 1275 ml / NET: 865 ml          LABS:      CBC Full  -  ( 26 May 2017 10:57 )  WBC Count : 8.4 K/uL  Hemoglobin : 8.2 g/dL  Hematocrit : 23.5 %  Platelet Count - Automated : 166 K/uL  Mean Cell Volume : 87.4 fL  Mean Cell Hemoglobin : 30.5 pg  Mean Cell Hemoglobin Concentration : 34.9 g/dL  Auto Neutrophil # : x  Auto Lymphocyte # : x  Auto Monocyte # : x  Auto Eosinophil # : x  Auto Basophil # : x  Auto Neutrophil % : x  Auto Lymphocyte % : x  Auto Monocyte % : x  Auto Eosinophil % : x  Auto Basophil % : x    05-26    130<L>  |  96  |  14  ----------------------------<  116<H>  4.4   |  24  |  0.60    Ca    8.0<L>      26 May 2017 10:58                      RADIOLOGY & ADDITIONAL STUDIES (The following images were personally reviewed): Interval Events:  Patient seen and examined at bedside.        MEDICATIONS:  Pulmonary:  ALBUTerol/ipratropium for Nebulization 3milliLiter(s) Nebulizer every 6 hours  ALBUTerol    90 MICROgram(s) HFA Inhaler 1Puff(s) Inhalation every 4 hours  buDESOnide 160 MICROgram(s)/formoterol 4.5 MICROgram(s) Inhaler 2Puff(s) Inhalation two times a day  tiotropium 18 MICROgram(s) Capsule 1Capsule(s) Inhalation daily    Antimicrobials:    Anticoagulants:  aspirin enteric coated 325milliGRAM(s) Oral two times a day    Cardiac:  losartan 50milliGRAM(s) Oral daily      Allergies    penicillins (Rash)    Intolerances        Vital Signs Last 24 Hrs  T(C): 36.9, Max: 37.7 (05-26 @ 00:37)  T(F): 98.5, Max: 99.8 (05-26 @ 00:37)  HR: 102 (90 - 109)  BP: 124/58 (100/52 - 158/70)  BP(mean): --  RR: 20 (19 - 22)  SpO2: 100% (96% - 100%)    I & Os for current day (as of 05-26 @ 11:51)  =============================================  IN: 2140 ml / OUT: 1275 ml / NET: 865 ml          LABS:      CBC Full  -  ( 26 May 2017 10:57 )  WBC Count : 8.4 K/uL  Hemoglobin : 8.2 g/dL  Hematocrit : 23.5 %  Platelet Count - Automated : 166 K/uL  Mean Cell Volume : 87.4 fL  Mean Cell Hemoglobin : 30.5 pg  Mean Cell Hemoglobin Concentration : 34.9 g/dL  Auto Neutrophil # : x  Auto Lymphocyte # : x  Auto Monocyte # : x  Auto Eosinophil # : x  Auto Basophil # : x  Auto Neutrophil % : x  Auto Lymphocyte % : x  Auto Monocyte % : x  Auto Eosinophil % : x  Auto Basophil % : x    05-26    130<L>  |  96  |  14  ----------------------------<  116<H>  4.4   |  24  |  0.60    Ca    8.0<L>      26 May 2017 10:58        RADIOLOGY & ADDITIONAL STUDIES (The following images were personally reviewed):

## 2017-05-26 NOTE — DISCHARGE NOTE ADULT - HOSPITAL COURSE
Admit 5/22/17  OR 5/23/17  Periop Antibx  DVT ppx  PT   Pain mgt  Left rib fractures 5 to 7 Admit 5/22/17  OR 5/23/17  Periop Antibx  DVT ppx  PT   Pain mgt  Left rib fractures 5 to 7  pulm c/s  psychiatry c/s

## 2017-05-26 NOTE — DISCHARGE NOTE ADULT - ADDITIONAL INSTRUCTIONS
Follow up with your pulmonologist for management of lung nodules/COPD.  Provide report of the chest cat scan (CT) to determine if a repeat CT or PET scan is necessary

## 2017-05-26 NOTE — DISCHARGE NOTE ADULT - NS AS ACTIVITY OBS
No Heavy lifting/straining/Do not make important decisions/Do not drive or operate machinery/Showering allowed

## 2017-05-26 NOTE — DISCHARGE NOTE ADULT - MEDICATION SUMMARY - MEDICATIONS TO TAKE
I will START or STAY ON the medications listed below when I get home from the hospital:    aspirin 325 mg oral delayed release tablet  -- 1 tab(s) by mouth 2 times a day  Take for 4 weeks after surgery to prevent blood clots  -- Indication: For Prevent blood clots    acetaminophen 325 mg oral tablet  -- 3 tab(s) by mouth every 8 hours  -- Indication: For Pain control    celecoxib 200 mg oral capsule  -- 1 cap(s) by mouth 2 times a day (with meals)  -- Indication: For Antiinflammatory/pain control    oxyCODONE 5 mg oral tablet  -- 1 tab(s) by mouth every 4 hours, As needed, Moderate Pain (4 - 6)  -- Indication: For moderate pain    oxyCODONE 10 mg oral tablet  -- 1 tab(s) by mouth every 4 hours, As needed, Severe Pain (7 - 10)  -- Indication: For severe pain    losartan 50 mg oral tablet  -- 1 tab(s) by mouth once a day  -- Indication: For Hypertension    simvastatin 10 mg oral tablet  -- 1 tab(s) by mouth once a day (at bedtime)  -- Indication: For Hyperlipidemia    albuterol-ipratropium 2.5 mg-0.5 mg/3 mL inhalation solution  -- 3 milliliter(s) inhaled every 6 hours  -- Indication: For COPD (chronic obstructive pulmonary disease)    budesonide-formoterol 160 mcg-4.5 mcg/inh inhalation aerosol  -- 2 puffs inhaled two times daily  Pt may use home Breo if she prefers  -- Indication: For COPD (chronic obstructive pulmonary disease)    tiotropium 18 mcg inhalation capsule  -- 1 cap(s) inhaled once a day  Patient may use her home spiriva if she prefers  -- Indication: For COPD (chronic obstructive pulmonary disease)    lidocaine 5% topical film  --  1 patch on skin to posterior left ribs level 5 to 7   -- Indication: For left rib fractures 5 to 7    bisacodyl 5 mg oral delayed release tablet  -- 1 tab(s) by mouth every 12 hours, As needed, Constipation  -- Indication: For COnstipation    docusate sodium 100 mg oral capsule  -- 1 cap(s) by mouth 3 times a day  -- Indication: For COnstipation    polyethylene glycol 3350 oral powder for reconstitution  -- 17 gram(s) by mouth once a day  -- Indication: For COnstipation    senna oral tablet  -- 2 tab(s) by mouth once a day (at bedtime), As needed, Constipation  -- Indication: For COnstipation    bisacodyl 10 mg rectal suppository  -- 1 suppository(ies) rectally once a day, As needed, Constipation  -- Indication: For COnstipation I will START or STAY ON the medications listed below when I get home from the hospital:    aspirin 325 mg oral delayed release tablet  -- 1 tab(s) by mouth 2 times a day  Take for 4 weeks after surgery to prevent blood clots  -- Indication: For Prevent blood clots    acetaminophen 325 mg oral tablet  -- 3 tab(s) by mouth every 8 hours  -- Indication: For Pain control    celecoxib 200 mg oral capsule  -- 1 cap(s) by mouth 2 times a day (with meals)  -- Indication: For Antiinflammatory/pain control    oxyCODONE 5 mg oral tablet  -- 1 tab(s) by mouth every 4 hours, As needed, Moderate Pain (4 - 6)  -- Indication: For moderate pain    oxyCODONE 10 mg oral tablet  -- 1 tab(s) by mouth every 4 hours, As needed, Severe Pain (7 - 10)  -- Indication: For severe pain    losartan 50 mg oral tablet  -- 1 tab(s) by mouth once a day  -- Indication: For Hypertension    tamsulosin 0.4 mg oral capsule  -- 1 cap(s) by mouth once a day (at bedtime)  -- Indication: For Urinary retention    simvastatin 10 mg oral tablet  -- 1 tab(s) by mouth once a day (at bedtime)  -- Indication: For Hyperlipidemia    albuterol-ipratropium 2.5 mg-0.5 mg/3 mL inhalation solution  -- 3 milliliter(s) inhaled every 6 hours  -- Indication: For COPD (chronic obstructive pulmonary disease)    budesonide-formoterol 160 mcg-4.5 mcg/inh inhalation aerosol  -- 2 puffs inhaled two times daily  Pt may use home Breo if she prefers  -- Indication: For COPD (chronic obstructive pulmonary disease)    tiotropium 18 mcg inhalation capsule  -- 1 cap(s) inhaled once a day  Patient may use her home spiriva if she prefers  -- Indication: For COPD (chronic obstructive pulmonary disease)    lidocaine 5% topical film  --  1 patch on skin to posterior left ribs level 5 to 7   -- Indication: For left rib fractures 5 to 7    bisacodyl 5 mg oral delayed release tablet  -- 1 tab(s) by mouth every 12 hours, As needed, Constipation  -- Indication: For COnstipation    docusate sodium 100 mg oral capsule  -- 1 cap(s) by mouth 3 times a day  -- Indication: For COnstipation    polyethylene glycol 3350 oral powder for reconstitution  -- 17 gram(s) by mouth once a day  -- Indication: For COnstipation    senna oral tablet  -- 2 tab(s) by mouth once a day (at bedtime), As needed, Constipation  -- Indication: For COnstipation    bisacodyl 10 mg rectal suppository  -- 1 suppository(ies) rectally once a day, As needed, Constipation  -- Indication: For COnstipation    lactulose 10 g/15 mL oral syrup  -- 22.5 milliliter(s) by mouth once a day, As needed, constipation  -- Indication: For COnstipation    sodium biphosphate-sodium phosphate 7 g-19 g rectal enema  -- 1 each rectally once a day, As needed, constipation  -- Indication: For COnstipation    pantoprazole 40 mg oral delayed release tablet  -- 1 tab(s) by mouth once a day (before a meal)  -- Indication: For gastrointestinal agent

## 2017-05-26 NOTE — DISCHARGE NOTE ADULT - PATIENT PORTAL LINK FT
“You can access the [facility] Patient Portal, offered by [facility], by registering with the following website: [email or web address]”

## 2017-05-26 NOTE — DISCHARGE NOTE ADULT - PLAN OF CARE
Improved ambulation and decreased pain after left hip intramedullary nailing 5/23/16 s/p left hip fracture from fall Weight bearing as tolerated on left leg with assistive device (rolling walker) and assistance.  No strenuous activity, heavy lifting, driving, tub bathing, or returning to work until cleared by MD.  You may shower--dressing is waterproof.  Remove dressing after post op day 7, then leave incision open to air.  Call Dr. Garner to schedule an appt within 10-14 days.  If you don't have a bowel movement by post op day 3, then take Milk of Magnesia (over the counter).  If no bowel movement by at least post op day 5, then use a Dulcolax suppository (over the counter) and/or a Fleets enema--if still no bowel movement, call your MD.  Contact your doctor if you experience: fever greater than 101.5, chills, chest pain, difficulty breathing, bleeding, redness or heat around the incision.  Patient has advanced COPD and should use 2L NC for exertion.  Continue home COPD medicines and follow up with your pulmonologist.  Patient fractured her left ribs 5 to 7.  Apply lidoderm patch daily for pain control and encourage IS. Weight bearing as tolerated on left leg with assistive device (rolling walker) and assistance.  You have a MARTA incisional drain to promote wound closure. Leave on for 7 days from 5/30.  Then keep incision open to air.  No strenuous activity, heavy lifting, driving, tub bathing, or returning to work until cleared by MD.  You may shower--dressing is waterproof.  Call Dr. Garner to schedule a follow up appointment for 6/8/17.  If you don't have a bowel movement by post op day 3, then take Milk of Magnesia (over the counter).  If no bowel movement by at least post op day 5, then use a Dulcolax suppository (over the counter) and/or a Fleets enema--if still no bowel movement, call your MD.  Contact your doctor if you experience: fever greater than 101.5, chills, chest pain, difficulty breathing, bleeding, redness or heat around the incision.  Patient has advanced COPD and should use 2L NC for exertion.  Continue home COPD medicines and follow up with your pulmonologist.  Patient fractured her left ribs 5 to 7.  Apply lidoderm patch daily for pain control and encourage IS. Weight bearing as tolerated on left leg with assistive device (rolling walker) and assistance.  You have a MARTA incisional drain to promote wound closure. Leave on for 7 days from 5/30.  Then keep incision open to air.  No strenuous activity, heavy lifting, driving, tub bathing, or returning to work until cleared by MD.  You may shower--dressing is waterproof.  Call Dr. Garner to schedule a follow up appointment for 6/8/17.  If you don't have a bowel movement by post op day 3, then take Milk of Magnesia (over the counter).  If no bowel movement by at least post op day 5, then use a Dulcolax suppository (over the counter) and/or a Fleets enema--if still no bowel movement, call your MD.  Contact your doctor if you experience: fever greater than 101.5, chills, chest pain, difficulty breathing, bleeding, redness or heat around the incision.  Patient has advanced COPD and should use 2L NC for exertion.  Continue home COPD medicines and follow up with your pulmonologist.  Patient fractured her left ribs 5 to 7.  Apply lidoderm patch daily for pain control and encourage IS.  Patient has a fierro for urinary retention.  Do not remove until she increases ambulation and is having regular bowel movements. Continue flomax at night

## 2017-05-26 NOTE — PROGRESS NOTE ADULT - SUBJECTIVE AND OBJECTIVE BOX
Interval Events: reviewed  Patient seen and examined at bedside.    Patient is a 81y old  Female who presents with a chief complaint of left hip pain, left rib pain (26 May 2017 11:21)  She she feels comfort. She needs to go but she can't.  She is slightly sob    PAST MEDICAL & SURGICAL HISTORY:      MEDICATIONS:  Pulmonary:  ALBUTerol/ipratropium for Nebulization 3milliLiter(s) Nebulizer every 6 hours  ALBUTerol    90 MICROgram(s) HFA Inhaler 1Puff(s) Inhalation every 4 hours  buDESOnide 160 MICROgram(s)/formoterol 4.5 MICROgram(s) Inhaler 2Puff(s) Inhalation two times a day  tiotropium 18 MICROgram(s) Capsule 1Capsule(s) Inhalation daily    Antimicrobials:    Anticoagulants:  aspirin enteric coated 325milliGRAM(s) Oral two times a day    Cardiac:  losartan 50milliGRAM(s) Oral daily      Allergies    penicillins (Rash)    Intolerances        Vital Signs Last 24 Hrs  T(C): 37.2, Max: 37.7 (05-26 @ 00:37)  T(F): 98.9, Max: 99.8 (05-26 @ 00:37)  HR: 94 (93 - 109)  BP: 137/63 (104/51 - 158/70)  BP(mean): --  RR: 18 (17 - 20)  SpO2: 100% (97% - 100%)  I & Os for 24h ending 05-26 @ 07:00  =============================================  IN: 2140 ml / OUT: 1275 ml / NET: 865 ml    I & Os for current day (as of 05-26 @ 22:00)  =============================================  IN: 1040 ml / OUT: 1400 ml / NET: -360 ml        LABS:      CBC Full  -  ( 26 May 2017 10:57 )  WBC Count : 8.4 K/uL  Hemoglobin : 8.2 g/dL  Hematocrit : 23.5 %  Platelet Count - Automated : 166 K/uL  Mean Cell Volume : 87.4 fL  Mean Cell Hemoglobin : 30.5 pg  Mean Cell Hemoglobin Concentration : 34.9 g/dL  Auto Neutrophil # : x  Auto Lymphocyte # : x  Auto Monocyte # : x  Auto Eosinophil # : x  Auto Basophil # : x  Auto Neutrophil % : x  Auto Lymphocyte % : x  Auto Monocyte % : x  Auto Eosinophil % : x  Auto Basophil % : x    05-26    130<L>  |  96  |  14  ----------------------------<  116<H>  4.4   |  24  |  0.60    Ca    8.0<L>      26 May 2017 10:58                      EXAM:  XR CHEST 1 VIEW PORT URGENT                          PROCEDURE DATE:  05/26/2017                     INTERPRETATION:  Indication: COPD, desaturation    A single portable view of the chest is submitted. Comparison is made to   the most recentprior study dated 5/22/2017.     Cardiac size is   difficult to assess secondary to portable technique.  Bibasilar   atelectatic changes. No pneumothorax. No large pleural effusions.   Emphysematous changes noted. Linear calcification in left upper lung zone.    Impression:  Bibasilar atelectatic changes.       RADIOLOGY & ADDITIONAL STUDIES (The following images were personally reviewed):  Stanton:                                  No  Urine output:               Yes         DVT prophylaxis:         Yes        Flattus:                          Yes         Bowel movement:          No

## 2017-05-26 NOTE — DIETITIAN INITIAL EVALUATION ADULT. - NS AS NUTRI INTERV ED CONTENT
Spoke with daughter.  Encouraged increased intake, as tolerated, to better meet estimated nutrient needs

## 2017-05-26 NOTE — DISCHARGE NOTE ADULT - CARE PLAN
Principal Discharge DX:	Pain of left hip joint  Goal:	Improved ambulation and decreased pain after left hip intramedullary nailing 5/23/16 s/p left hip fracture from fall  Instructions for follow-up, activity and diet:	Weight bearing as tolerated on left leg with assistive device (rolling walker) and assistance.  No strenuous activity, heavy lifting, driving, tub bathing, or returning to work until cleared by MD.  You may shower--dressing is waterproof.  Remove dressing after post op day 7, then leave incision open to air.  Call Dr. Garner to schedule an appt within 10-14 days.  If you don't have a bowel movement by post op day 3, then take Milk of Magnesia (over the counter).  If no bowel movement by at least post op day 5, then use a Dulcolax suppository (over the counter) and/or a Fleets enema--if still no bowel movement, call your MD.  Contact your doctor if you experience: fever greater than 101.5, chills, chest pain, difficulty breathing, bleeding, redness or heat around the incision.  Patient has advanced COPD and should use 2L NC for exertion.  Continue home COPD medicines and follow up with your pulmonologist.  Patient fractured her left ribs 5 to 7.  Apply lidoderm patch daily for pain control and encourage IS. Principal Discharge DX:	Pain of left hip joint  Goal:	Improved ambulation and decreased pain after left hip intramedullary nailing 5/23/16 s/p left hip fracture from fall  Instructions for follow-up, activity and diet:	Weight bearing as tolerated on left leg with assistive device (rolling walker) and assistance.  You have a MARTA incisional drain to promote wound closure. Leave on for 7 days from 5/30.  Then keep incision open to air.  No strenuous activity, heavy lifting, driving, tub bathing, or returning to work until cleared by MD.  You may shower--dressing is waterproof.  Call Dr. Garner to schedule a follow up appointment for 6/8/17.  If you don't have a bowel movement by post op day 3, then take Milk of Magnesia (over the counter).  If no bowel movement by at least post op day 5, then use a Dulcolax suppository (over the counter) and/or a Fleets enema--if still no bowel movement, call your MD.  Contact your doctor if you experience: fever greater than 101.5, chills, chest pain, difficulty breathing, bleeding, redness or heat around the incision.  Patient has advanced COPD and should use 2L NC for exertion.  Continue home COPD medicines and follow up with your pulmonologist.  Patient fractured her left ribs 5 to 7.  Apply lidoderm patch daily for pain control and encourage IS. Principal Discharge DX:	Pain of left hip joint  Goal:	Improved ambulation and decreased pain after left hip intramedullary nailing 5/23/16 s/p left hip fracture from fall  Instructions for follow-up, activity and diet:	Weight bearing as tolerated on left leg with assistive device (rolling walker) and assistance.  You have a MARTA incisional drain to promote wound closure. Leave on for 7 days from 5/30.  Then keep incision open to air.  No strenuous activity, heavy lifting, driving, tub bathing, or returning to work until cleared by MD.  You may shower--dressing is waterproof.  Call Dr. Garner to schedule a follow up appointment for 6/8/17.  If you don't have a bowel movement by post op day 3, then take Milk of Magnesia (over the counter).  If no bowel movement by at least post op day 5, then use a Dulcolax suppository (over the counter) and/or a Fleets enema--if still no bowel movement, call your MD.  Contact your doctor if you experience: fever greater than 101.5, chills, chest pain, difficulty breathing, bleeding, redness or heat around the incision.  Patient has advanced COPD and should use 2L NC for exertion.  Continue home COPD medicines and follow up with your pulmonologist.  Patient fractured her left ribs 5 to 7.  Apply lidoderm patch daily for pain control and encourage IS.  Patient has a fierro for urinary retention.  Do not remove until she increases ambulation and is having regular bowel movements. Continue flomax at night

## 2017-05-26 NOTE — PROGRESS NOTE ADULT - SUBJECTIVE AND OBJECTIVE BOX
SUBJECTIVE: Patient seen and examined.  No issues overnight. Pain well controlled. Seen standing at the bedside without pain.  OBJECTIVE:     Vital Signs Last 24 Hrs  T(C): 36.7, Max: 37.7 (05-26 @ 00:37)  T(F): 98, Max: 99.8 (05-26 @ 00:37)  HR: 109 (90 - 112)  BP: 158/70 (100/52 - 158/70)  BP(mean): --  RR: 20 (18 - 22)  SpO2: 97% (96% - 100%)     LLE           Dressing: clean/dry/intact            Motor exam:  TA 5/5 EHL 5/5 GSC 5/5          Sensation:   T SILT SPH SILT DP SILT         Vascular:  Warm/pink/well perfused             LABS:                MEDICATIONS:  MEDICATIONS  (STANDING):  docusate sodium 100milliGRAM(s) Oral three times a day  losartan 50milliGRAM(s) Oral daily  simvastatin 10milliGRAM(s) Oral at bedtime  ALBUTerol/ipratropium for Nebulization 3milliLiter(s) Nebulizer every 6 hours  ALBUTerol    90 MICROgram(s) HFA Inhaler 1Puff(s) Inhalation every 4 hours  aspirin enteric coated 325milliGRAM(s) Oral two times a day  polyethylene glycol 3350 17Gram(s) Oral daily  buDESOnide 160 MICROgram(s)/formoterol 4.5 MICROgram(s) Inhaler 2Puff(s) Inhalation two times a day  acetaminophen   Tablet. 975milliGRAM(s) Oral every 8 hours  lidocaine   Patch 1Patch Transdermal daily  sodium chloride 0.9%. 1000milliLiter(s) IV Continuous <Continuous>  celecoxib 200milliGRAM(s) Oral two times a day with meals  ketorolac   Injectable 15milliGRAM(s) IV Push every 6 hours  tiotropium 18 MICROgram(s) Capsule 1Capsule(s) Inhalation daily    MEDICATIONS  (PRN):  acetaminophen   Tablet 650milliGRAM(s) Oral every 6 hours PRN For Temp over 38.3 C (100.94 F)  aluminum hydroxide/magnesium hydroxide/simethicone Suspension 30milliLiter(s) Oral four times a day PRN Indigestion  ondansetron Injectable 4milliGRAM(s) IV Push every 6 hours PRN Nausea and/or Vomiting  senna 2Tablet(s) Oral at bedtime PRN Constipation  oxyCODONE IR 5milliGRAM(s) Oral every 4 hours PRN Moderate Pain (4 - 6)  oxyCODONE IR 10milliGRAM(s) Oral every 4 hours PRN Severe Pain (7 - 10)  bisacodyl 5milliGRAM(s) Oral every 12 hours PRN Constipation  bisacodyl Suppository 10milliGRAM(s) Rectal daily PRN Constipation      Anticoagulation:  aspirin enteric coated 325milliGRAM(s) Oral two times a day      Antibiotics:       Pain medications:   acetaminophen   Tablet 650milliGRAM(s) Oral every 6 hours PRN  ondansetron Injectable 4milliGRAM(s) IV Push every 6 hours PRN  acetaminophen   Tablet. 975milliGRAM(s) Oral every 8 hours  celecoxib 200milliGRAM(s) Oral two times a day with meals  ketorolac   Injectable 15milliGRAM(s) IV Push every 6 hours  oxyCODONE IR 5milliGRAM(s) Oral every 4 hours PRN  oxyCODONE IR 10milliGRAM(s) Oral every 4 hours PRN        A/P :   s/p   L hip IMN for IT fracture POD #3  -    Pain control  -    DVT ppx: aspirin enteric coated 325milliGRAM(s) Oral two times a day  -    Check AM labs  -    Weight bearing status:  WBAT  -    Physical Therapy  -    Dispo: Rehab

## 2017-05-26 NOTE — DIETITIAN INITIAL EVALUATION ADULT. - OTHER INFO
Pt admitted with left hip IT fracture and posterior rib fractures; s/p left hip IMN.  Per daughter pt with poor-fair appetite at present; consuming <50% of meals.  Pt denies preferences or oral nutrition supplements.  Pt with constipation; no further GI distress.  Fluids/fiber encouraged; bowel regimen ordered.  Pt planned for increased ambulation as cleared by PT.  Pain is being managed.  NKFA.  Skin: surgical incision.

## 2017-05-27 LAB
ANION GAP SERPL CALC-SCNC: 11 MMOL/L — SIGNIFICANT CHANGE UP (ref 5–17)
BUN SERPL-MCNC: 12 MG/DL — SIGNIFICANT CHANGE UP (ref 7–23)
CALCIUM SERPL-MCNC: 7.9 MG/DL — LOW (ref 8.4–10.5)
CHLORIDE SERPL-SCNC: 98 MMOL/L — SIGNIFICANT CHANGE UP (ref 96–108)
CO2 SERPL-SCNC: 23 MMOL/L — SIGNIFICANT CHANGE UP (ref 22–31)
CREAT SERPL-MCNC: 0.6 MG/DL — SIGNIFICANT CHANGE UP (ref 0.5–1.3)
GLUCOSE SERPL-MCNC: 89 MG/DL — SIGNIFICANT CHANGE UP (ref 70–99)
HCT VFR BLD CALC: 23.2 % — LOW (ref 34.5–45)
HGB BLD-MCNC: 8 G/DL — LOW (ref 11.5–15.5)
MCHC RBC-ENTMCNC: 30.4 PG — SIGNIFICANT CHANGE UP (ref 27–34)
MCHC RBC-ENTMCNC: 34.5 G/DL — SIGNIFICANT CHANGE UP (ref 32–36)
MCV RBC AUTO: 88.2 FL — SIGNIFICANT CHANGE UP (ref 80–100)
PLATELET # BLD AUTO: 187 K/UL — SIGNIFICANT CHANGE UP (ref 150–400)
POTASSIUM SERPL-MCNC: 4.2 MMOL/L — SIGNIFICANT CHANGE UP (ref 3.5–5.3)
POTASSIUM SERPL-SCNC: 4.2 MMOL/L — SIGNIFICANT CHANGE UP (ref 3.5–5.3)
RBC # BLD: 2.63 M/UL — LOW (ref 3.8–5.2)
RBC # FLD: 14.8 % — SIGNIFICANT CHANGE UP (ref 10.3–16.9)
SODIUM SERPL-SCNC: 132 MMOL/L — LOW (ref 135–145)
WBC # BLD: 9.3 K/UL — SIGNIFICANT CHANGE UP (ref 3.8–10.5)
WBC # FLD AUTO: 9.3 K/UL — SIGNIFICANT CHANGE UP (ref 3.8–10.5)

## 2017-05-27 PROCEDURE — 99233 SBSQ HOSP IP/OBS HIGH 50: CPT | Mod: GC

## 2017-05-27 RX ORDER — FUROSEMIDE 40 MG
20 TABLET ORAL ONCE
Qty: 0 | Refills: 0 | Status: COMPLETED | OUTPATIENT
Start: 2017-05-27 | End: 2017-05-27

## 2017-05-27 RX ADMIN — OXYCODONE HYDROCHLORIDE 10 MILLIGRAM(S): 5 TABLET ORAL at 13:02

## 2017-05-27 RX ADMIN — OXYCODONE HYDROCHLORIDE 10 MILLIGRAM(S): 5 TABLET ORAL at 05:15

## 2017-05-27 RX ADMIN — Medication 100 MILLIGRAM(S): at 22:06

## 2017-05-27 RX ADMIN — CELECOXIB 200 MILLIGRAM(S): 200 CAPSULE ORAL at 17:51

## 2017-05-27 RX ADMIN — POLYETHYLENE GLYCOL 3350 17 GRAM(S): 17 POWDER, FOR SOLUTION ORAL at 11:55

## 2017-05-27 RX ADMIN — Medication 975 MILLIGRAM(S): at 13:31

## 2017-05-27 RX ADMIN — Medication 100 MILLIGRAM(S): at 13:32

## 2017-05-27 RX ADMIN — OXYCODONE HYDROCHLORIDE 5 MILLIGRAM(S): 5 TABLET ORAL at 09:20

## 2017-05-27 RX ADMIN — SIMVASTATIN 10 MILLIGRAM(S): 20 TABLET, FILM COATED ORAL at 22:06

## 2017-05-27 RX ADMIN — Medication 975 MILLIGRAM(S): at 22:06

## 2017-05-27 RX ADMIN — CELECOXIB 200 MILLIGRAM(S): 200 CAPSULE ORAL at 09:15

## 2017-05-27 RX ADMIN — Medication 325 MILLIGRAM(S): at 06:28

## 2017-05-27 RX ADMIN — Medication 975 MILLIGRAM(S): at 06:30

## 2017-05-27 RX ADMIN — Medication 325 MILLIGRAM(S): at 17:47

## 2017-05-27 RX ADMIN — OXYCODONE HYDROCHLORIDE 10 MILLIGRAM(S): 5 TABLET ORAL at 04:36

## 2017-05-27 RX ADMIN — Medication 975 MILLIGRAM(S): at 07:15

## 2017-05-27 RX ADMIN — Medication 975 MILLIGRAM(S): at 14:00

## 2017-05-27 RX ADMIN — Medication 3 MILLILITER(S): at 06:28

## 2017-05-27 RX ADMIN — Medication 3 MILLILITER(S): at 17:51

## 2017-05-27 RX ADMIN — TIOTROPIUM BROMIDE 1 CAPSULE(S): 18 CAPSULE ORAL; RESPIRATORY (INHALATION) at 13:03

## 2017-05-27 RX ADMIN — Medication 975 MILLIGRAM(S): at 22:50

## 2017-05-27 RX ADMIN — OXYCODONE HYDROCHLORIDE 10 MILLIGRAM(S): 5 TABLET ORAL at 19:41

## 2017-05-27 RX ADMIN — BUDESONIDE AND FORMOTEROL FUMARATE DIHYDRATE 2 PUFF(S): 160; 4.5 AEROSOL RESPIRATORY (INHALATION) at 07:50

## 2017-05-27 RX ADMIN — LIDOCAINE 1 PATCH: 4 CREAM TOPICAL at 12:20

## 2017-05-27 RX ADMIN — Medication 20 MILLIGRAM(S): at 15:48

## 2017-05-27 RX ADMIN — Medication 3 MILLILITER(S): at 11:55

## 2017-05-27 RX ADMIN — OXYCODONE HYDROCHLORIDE 10 MILLIGRAM(S): 5 TABLET ORAL at 20:24

## 2017-05-27 RX ADMIN — OXYCODONE HYDROCHLORIDE 5 MILLIGRAM(S): 5 TABLET ORAL at 08:53

## 2017-05-27 RX ADMIN — CELECOXIB 200 MILLIGRAM(S): 200 CAPSULE ORAL at 08:48

## 2017-05-27 RX ADMIN — Medication 100 MILLIGRAM(S): at 06:28

## 2017-05-27 RX ADMIN — LOSARTAN POTASSIUM 50 MILLIGRAM(S): 100 TABLET, FILM COATED ORAL at 06:28

## 2017-05-27 RX ADMIN — BUDESONIDE AND FORMOTEROL FUMARATE DIHYDRATE 2 PUFF(S): 160; 4.5 AEROSOL RESPIRATORY (INHALATION) at 17:48

## 2017-05-27 RX ADMIN — OXYCODONE HYDROCHLORIDE 10 MILLIGRAM(S): 5 TABLET ORAL at 13:30

## 2017-05-27 RX ADMIN — CELECOXIB 200 MILLIGRAM(S): 200 CAPSULE ORAL at 18:15

## 2017-05-27 RX ADMIN — LIDOCAINE 1 PATCH: 4 CREAM TOPICAL at 00:00

## 2017-05-27 RX ADMIN — Medication 30 MILLILITER(S): at 21:11

## 2017-05-27 NOTE — PROGRESS NOTE ADULT - SUBJECTIVE AND OBJECTIVE BOX
Interval Events: reviewed   Patient seen and examined at bedside.    Patient is a 81y old  Female who presents with a chief complaint of left hip pain, left rib pain (26 May 2017 11:21)  she is doing better still coughing    PAST MEDICAL & SURGICAL HISTORY:      MEDICATIONS:  Pulmonary:  ALBUTerol/ipratropium for Nebulization 3milliLiter(s) Nebulizer every 6 hours  ALBUTerol    90 MICROgram(s) HFA Inhaler 1Puff(s) Inhalation every 4 hours  buDESOnide 160 MICROgram(s)/formoterol 4.5 MICROgram(s) Inhaler 2Puff(s) Inhalation two times a day  tiotropium 18 MICROgram(s) Capsule 1Capsule(s) Inhalation daily    Antimicrobials:    Anticoagulants:  aspirin enteric coated 325milliGRAM(s) Oral two times a day    Cardiac:  losartan 50milliGRAM(s) Oral daily  furosemide   Injectable 20milliGRAM(s) IV Push once      Allergies    penicillins (Rash)    Intolerances        Vital Signs Last 24 Hrs  T(C): 35.8, Max: 37.4 (05-27 @ 04:48)  T(F): 96.5, Max: 99.4 (05-27 @ 04:48)  HR: 92 (78 - 103)  BP: 124/60 (124/60 - 167/70)  BP(mean): --  RR: 18 (16 - 18)  SpO2: 96% (96% - 100%)  I & Os for 24h ending 05-27 @ 07:00  =============================================  IN: 1040 ml / OUT: 1900 ml / NET: -860 ml    I & Os for current day (as of 05-27 @ 15:09)  =============================================  IN: 0 ml / OUT: 800 ml / NET: -800 ml        LABS:      CBC Full  -  ( 27 May 2017 07:37 )  WBC Count : 9.3 K/uL  Hemoglobin : 8.0 g/dL  Hematocrit : 23.2 %  Platelet Count - Automated : 187 K/uL  Mean Cell Volume : 88.2 fL  Mean Cell Hemoglobin : 30.4 pg  Mean Cell Hemoglobin Concentration : 34.5 g/dL  Auto Neutrophil # : x  Auto Lymphocyte # : x  Auto Monocyte # : x  Auto Eosinophil # : x  Auto Basophil # : x  Auto Neutrophil % : x  Auto Lymphocyte % : x  Auto Monocyte % : x  Auto Eosinophil % : x  Auto Basophil % : x    05-27    132<L>  |  98  |  12  ----------------------------<  89  4.2   |  23  |  0.60    Ca    7.9<L>      27 May 2017 07:37                          RADIOLOGY & ADDITIONAL STUDIES (The following images were personally reviewed):  Stanton:                            Yes   Urine output:               Yes          DVT prophylaxis:         Yes          Flattus:                          Yes        Bowel movement:       Yes

## 2017-05-27 NOTE — PROGRESS NOTE ADULT - SUBJECTIVE AND OBJECTIVE BOX
SUBJECTIVE: Patient seen and examined.  No issues overnight. Pain well controlled. Seen standing at the bedside without pain. Took some steps with PT today. Stanton replaced due to Failure to void. Had a BM yesterday.      OBJECTIVE:     Vital Signs Last 24 Hrs  T(C): 36.6, Max: 37.4 (05-27 @ 04:48)  T(F): 97.9, Max: 99.4 (05-27 @ 04:48)  HR: 95 (78 - 105)  BP: 125/60 (125/60 - 158/70)  BP(mean): --  RR: 16 (16 - 19)  SpO2: 100% (97% - 100%)      I&O's Detail    I & Os for current day (as of 27 May 2017 11:39)  =============================================  IN:    Oral Fluid: 880 ml    sodium chloride 0.9%: 160 ml    Total IN: 1040 ml  ---------------------------------------------  OUT:    Indwelling Catheter - Urethral: 1900 ml    Total OUT: 1900 ml  ---------------------------------------------  Total NET: -860 ml       LLE           Dressing: clean/dry/intact            Motor exam:  TA 5/5 EHL 5/5 GSC 5/5          Sensation:   T SILT SPH SILT DP SILT         Vascular:  Warm/pink/well perfused             LABS:                MEDICATIONS:  MEDICATIONS  (STANDING):  docusate sodium 100milliGRAM(s) Oral three times a day  losartan 50milliGRAM(s) Oral daily  simvastatin 10milliGRAM(s) Oral at bedtime  ALBUTerol/ipratropium for Nebulization 3milliLiter(s) Nebulizer every 6 hours  ALBUTerol    90 MICROgram(s) HFA Inhaler 1Puff(s) Inhalation every 4 hours  aspirin enteric coated 325milliGRAM(s) Oral two times a day  polyethylene glycol 3350 17Gram(s) Oral daily  buDESOnide 160 MICROgram(s)/formoterol 4.5 MICROgram(s) Inhaler 2Puff(s) Inhalation two times a day  acetaminophen   Tablet. 975milliGRAM(s) Oral every 8 hours  lidocaine   Patch 1Patch Transdermal daily  sodium chloride 0.9%. 1000milliLiter(s) IV Continuous <Continuous>  celecoxib 200milliGRAM(s) Oral two times a day with meals  ketorolac   Injectable 15milliGRAM(s) IV Push every 6 hours  tiotropium 18 MICROgram(s) Capsule 1Capsule(s) Inhalation daily    MEDICATIONS  (PRN):  acetaminophen   Tablet 650milliGRAM(s) Oral every 6 hours PRN For Temp over 38.3 C (100.94 F)  aluminum hydroxide/magnesium hydroxide/simethicone Suspension 30milliLiter(s) Oral four times a day PRN Indigestion  ondansetron Injectable 4milliGRAM(s) IV Push every 6 hours PRN Nausea and/or Vomiting  senna 2Tablet(s) Oral at bedtime PRN Constipation  oxyCODONE IR 5milliGRAM(s) Oral every 4 hours PRN Moderate Pain (4 - 6)  oxyCODONE IR 10milliGRAM(s) Oral every 4 hours PRN Severe Pain (7 - 10)  bisacodyl 5milliGRAM(s) Oral every 12 hours PRN Constipation  bisacodyl Suppository 10milliGRAM(s) Rectal daily PRN Constipation      Anticoagulation:  aspirin enteric coated 325milliGRAM(s) Oral two times a day      Antibiotics:       Pain medications:   acetaminophen   Tablet 650milliGRAM(s) Oral every 6 hours PRN  ondansetron Injectable 4milliGRAM(s) IV Push every 6 hours PRN  acetaminophen   Tablet. 975milliGRAM(s) Oral every 8 hours  celecoxib 200milliGRAM(s) Oral two times a day with meals  ketorolac   Injectable 15milliGRAM(s) IV Push every 6 hours  oxyCODONE IR 5milliGRAM(s) Oral every 4 hours PRN  oxyCODONE IR 10milliGRAM(s) Oral every 4 hours PRN        A/P :   s/p   L hip IMN for IT fracture POD #4  -    Pain control  -    DVT ppx: aspirin enteric coated 325milliGRAM(s) Oral two times a day  -    Check AM labs  -    Weight bearing status:  WBAT  -    Physical Therapy  -    Dispo: Rehab

## 2017-05-28 DIAGNOSIS — R33.9 RETENTION OF URINE, UNSPECIFIED: ICD-10-CM

## 2017-05-28 LAB
ANION GAP SERPL CALC-SCNC: 9 MMOL/L — SIGNIFICANT CHANGE UP (ref 5–17)
BLD GP AB SCN SERPL QL: NEGATIVE — SIGNIFICANT CHANGE UP
BUN SERPL-MCNC: 12 MG/DL — SIGNIFICANT CHANGE UP (ref 7–23)
CALCIUM SERPL-MCNC: 8.2 MG/DL — LOW (ref 8.4–10.5)
CHLORIDE SERPL-SCNC: 96 MMOL/L — SIGNIFICANT CHANGE UP (ref 96–108)
CO2 SERPL-SCNC: 26 MMOL/L — SIGNIFICANT CHANGE UP (ref 22–31)
CREAT SERPL-MCNC: 0.6 MG/DL — SIGNIFICANT CHANGE UP (ref 0.5–1.3)
GLUCOSE SERPL-MCNC: 92 MG/DL — SIGNIFICANT CHANGE UP (ref 70–99)
HCT VFR BLD CALC: 21.5 % — LOW (ref 34.5–45)
HGB BLD-MCNC: 7.5 G/DL — LOW (ref 11.5–15.5)
MCHC RBC-ENTMCNC: 30.7 PG — SIGNIFICANT CHANGE UP (ref 27–34)
MCHC RBC-ENTMCNC: 34.9 G/DL — SIGNIFICANT CHANGE UP (ref 32–36)
MCV RBC AUTO: 88.1 FL — SIGNIFICANT CHANGE UP (ref 80–100)
PLATELET # BLD AUTO: 214 K/UL — SIGNIFICANT CHANGE UP (ref 150–400)
POTASSIUM SERPL-MCNC: 4.2 MMOL/L — SIGNIFICANT CHANGE UP (ref 3.5–5.3)
POTASSIUM SERPL-SCNC: 4.2 MMOL/L — SIGNIFICANT CHANGE UP (ref 3.5–5.3)
RBC # BLD: 2.44 M/UL — LOW (ref 3.8–5.2)
RBC # FLD: 14.7 % — SIGNIFICANT CHANGE UP (ref 10.3–16.9)
RH IG SCN BLD-IMP: POSITIVE — SIGNIFICANT CHANGE UP
SODIUM SERPL-SCNC: 131 MMOL/L — LOW (ref 135–145)
WBC # BLD: 9.8 K/UL — SIGNIFICANT CHANGE UP (ref 3.8–10.5)
WBC # FLD AUTO: 9.8 K/UL — SIGNIFICANT CHANGE UP (ref 3.8–10.5)

## 2017-05-28 PROCEDURE — 99233 SBSQ HOSP IP/OBS HIGH 50: CPT | Mod: GC

## 2017-05-28 RX ORDER — PANTOPRAZOLE SODIUM 20 MG/1
40 TABLET, DELAYED RELEASE ORAL
Qty: 0 | Refills: 0 | Status: DISCONTINUED | OUTPATIENT
Start: 2017-05-28 | End: 2017-05-30

## 2017-05-28 RX ADMIN — Medication 5 MILLIGRAM(S): at 22:03

## 2017-05-28 RX ADMIN — Medication 100 MILLIGRAM(S): at 05:55

## 2017-05-28 RX ADMIN — Medication 325 MILLIGRAM(S): at 05:55

## 2017-05-28 RX ADMIN — Medication 975 MILLIGRAM(S): at 13:37

## 2017-05-28 RX ADMIN — SENNA PLUS 2 TABLET(S): 8.6 TABLET ORAL at 22:03

## 2017-05-28 RX ADMIN — Medication 975 MILLIGRAM(S): at 05:56

## 2017-05-28 RX ADMIN — LIDOCAINE 1 PATCH: 4 CREAM TOPICAL at 11:29

## 2017-05-28 RX ADMIN — Medication 100 MILLIGRAM(S): at 22:03

## 2017-05-28 RX ADMIN — BUDESONIDE AND FORMOTEROL FUMARATE DIHYDRATE 2 PUFF(S): 160; 4.5 AEROSOL RESPIRATORY (INHALATION) at 05:56

## 2017-05-28 RX ADMIN — OXYCODONE HYDROCHLORIDE 10 MILLIGRAM(S): 5 TABLET ORAL at 04:00

## 2017-05-28 RX ADMIN — OXYCODONE HYDROCHLORIDE 10 MILLIGRAM(S): 5 TABLET ORAL at 18:15

## 2017-05-28 RX ADMIN — Medication 30 MILLILITER(S): at 13:54

## 2017-05-28 RX ADMIN — LIDOCAINE 1 PATCH: 4 CREAM TOPICAL at 00:38

## 2017-05-28 RX ADMIN — Medication 3 MILLILITER(S): at 05:56

## 2017-05-28 RX ADMIN — OXYCODONE HYDROCHLORIDE 10 MILLIGRAM(S): 5 TABLET ORAL at 09:41

## 2017-05-28 RX ADMIN — Medication 325 MILLIGRAM(S): at 17:19

## 2017-05-28 RX ADMIN — Medication 0.25 MILLIGRAM(S): at 22:03

## 2017-05-28 RX ADMIN — Medication 975 MILLIGRAM(S): at 22:34

## 2017-05-28 RX ADMIN — LIDOCAINE 1 PATCH: 4 CREAM TOPICAL at 22:34

## 2017-05-28 RX ADMIN — CELECOXIB 200 MILLIGRAM(S): 200 CAPSULE ORAL at 17:19

## 2017-05-28 RX ADMIN — Medication 975 MILLIGRAM(S): at 22:03

## 2017-05-28 RX ADMIN — Medication 975 MILLIGRAM(S): at 06:40

## 2017-05-28 RX ADMIN — Medication 975 MILLIGRAM(S): at 14:00

## 2017-05-28 RX ADMIN — Medication 100 MILLIGRAM(S): at 13:37

## 2017-05-28 RX ADMIN — OXYCODONE HYDROCHLORIDE 10 MILLIGRAM(S): 5 TABLET ORAL at 17:42

## 2017-05-28 RX ADMIN — CELECOXIB 200 MILLIGRAM(S): 200 CAPSULE ORAL at 09:04

## 2017-05-28 RX ADMIN — BUDESONIDE AND FORMOTEROL FUMARATE DIHYDRATE 2 PUFF(S): 160; 4.5 AEROSOL RESPIRATORY (INHALATION) at 17:46

## 2017-05-28 RX ADMIN — Medication 3 MILLILITER(S): at 11:28

## 2017-05-28 RX ADMIN — Medication 3 MILLILITER(S): at 00:36

## 2017-05-28 RX ADMIN — TIOTROPIUM BROMIDE 1 CAPSULE(S): 18 CAPSULE ORAL; RESPIRATORY (INHALATION) at 11:29

## 2017-05-28 RX ADMIN — CELECOXIB 200 MILLIGRAM(S): 200 CAPSULE ORAL at 09:41

## 2017-05-28 RX ADMIN — Medication 3 MILLILITER(S): at 23:44

## 2017-05-28 RX ADMIN — LOSARTAN POTASSIUM 50 MILLIGRAM(S): 100 TABLET, FILM COATED ORAL at 05:55

## 2017-05-28 RX ADMIN — Medication 3 MILLILITER(S): at 17:19

## 2017-05-28 RX ADMIN — SIMVASTATIN 10 MILLIGRAM(S): 20 TABLET, FILM COATED ORAL at 22:03

## 2017-05-28 RX ADMIN — POLYETHYLENE GLYCOL 3350 17 GRAM(S): 17 POWDER, FOR SOLUTION ORAL at 11:29

## 2017-05-28 RX ADMIN — OXYCODONE HYDROCHLORIDE 10 MILLIGRAM(S): 5 TABLET ORAL at 09:03

## 2017-05-28 RX ADMIN — OXYCODONE HYDROCHLORIDE 10 MILLIGRAM(S): 5 TABLET ORAL at 03:11

## 2017-05-28 RX ADMIN — CELECOXIB 200 MILLIGRAM(S): 200 CAPSULE ORAL at 18:15

## 2017-05-28 NOTE — PROGRESS NOTE ADULT - SUBJECTIVE AND OBJECTIVE BOX
SUBJECTIVE: Patient seen and examined.  No issues overnight. Pain well controlled. Seen standing at the bedside without pain. Took some steps with PT today. Stanton in place. Dressing was changed yesterday.    OBJECTIVE:     Vital Signs Last 24 Hrs  T(C): 36.7, Max: 37 (05-28 @ 00:34)  T(F): 98.1, Max: 98.6 (05-28 @ 00:34)  HR: 80 (80 - 100)  BP: 167/74 (124/60 - 167/74)  BP(mean): --  RR: 16 (16 - 18)  SpO2: 100% (92% - 100%)      I&O's Detail    I & Os for current day (as of 28 May 2017 07:05)  =============================================  IN:    Oral Fluid: 700 ml    Total IN: 700 ml  ---------------------------------------------  OUT:    Indwelling Catheter - Urethral: 3200 ml    Total OUT: 3200 ml  ---------------------------------------------  Total NET: -2500 ml         LLE           Dressing: clean/dry/intact            Motor exam:  TA 5/5 EHL 5/5 GSC 5/5          Sensation:   T SILT SPH SILT DP SILT         Vascular:  Warm/pink/well perfused             LABS:                MEDICATIONS:  MEDICATIONS  (STANDING):  docusate sodium 100milliGRAM(s) Oral three times a day  losartan 50milliGRAM(s) Oral daily  simvastatin 10milliGRAM(s) Oral at bedtime  ALBUTerol/ipratropium for Nebulization 3milliLiter(s) Nebulizer every 6 hours  ALBUTerol    90 MICROgram(s) HFA Inhaler 1Puff(s) Inhalation every 4 hours  aspirin enteric coated 325milliGRAM(s) Oral two times a day  polyethylene glycol 3350 17Gram(s) Oral daily  buDESOnide 160 MICROgram(s)/formoterol 4.5 MICROgram(s) Inhaler 2Puff(s) Inhalation two times a day  acetaminophen   Tablet. 975milliGRAM(s) Oral every 8 hours  lidocaine   Patch 1Patch Transdermal daily  sodium chloride 0.9%. 1000milliLiter(s) IV Continuous <Continuous>  celecoxib 200milliGRAM(s) Oral two times a day with meals  ketorolac   Injectable 15milliGRAM(s) IV Push every 6 hours  tiotropium 18 MICROgram(s) Capsule 1Capsule(s) Inhalation daily    MEDICATIONS  (PRN):  acetaminophen   Tablet 650milliGRAM(s) Oral every 6 hours PRN For Temp over 38.3 C (100.94 F)  aluminum hydroxide/magnesium hydroxide/simethicone Suspension 30milliLiter(s) Oral four times a day PRN Indigestion  ondansetron Injectable 4milliGRAM(s) IV Push every 6 hours PRN Nausea and/or Vomiting  senna 2Tablet(s) Oral at bedtime PRN Constipation  oxyCODONE IR 5milliGRAM(s) Oral every 4 hours PRN Moderate Pain (4 - 6)  oxyCODONE IR 10milliGRAM(s) Oral every 4 hours PRN Severe Pain (7 - 10)  bisacodyl 5milliGRAM(s) Oral every 12 hours PRN Constipation  bisacodyl Suppository 10milliGRAM(s) Rectal daily PRN Constipation      Anticoagulation:  aspirin enteric coated 325milliGRAM(s) Oral two times a day      Antibiotics:       Pain medications:   acetaminophen   Tablet 650milliGRAM(s) Oral every 6 hours PRN  ondansetron Injectable 4milliGRAM(s) IV Push every 6 hours PRN  acetaminophen   Tablet. 975milliGRAM(s) Oral every 8 hours  celecoxib 200milliGRAM(s) Oral two times a day with meals  ketorolac   Injectable 15milliGRAM(s) IV Push every 6 hours  oxyCODONE IR 5milliGRAM(s) Oral every 4 hours PRN  oxyCODONE IR 10milliGRAM(s) Oral every 4 hours PRN        A/P :   s/p   L hip IMN for IT fracture POD #5  -    Pain control  -    DVT ppx: aspirin enteric coated 325milliGRAM(s) Oral two times a day  -    Check AM labs  -    Weight bearing status:  WBAT  -    Physical Therapy  -    Dispo: Rehab

## 2017-05-28 NOTE — PROGRESS NOTE ADULT - SUBJECTIVE AND OBJECTIVE BOX
Interval Events: betowed  Patient seen and examined at bedside.    Patient is a 81y old  Female who presents with a chief complaint of left hip pain, left rib pain (26 May 2017 11:21)  she is nevous and the catheter is out but she did not urinate yet.  She had an emema and had BM    PAST MEDICAL & SURGICAL HISTORY:      MEDICATIONS:  Pulmonary:  ALBUTerol/ipratropium for Nebulization 3milliLiter(s) Nebulizer every 6 hours  ALBUTerol    90 MICROgram(s) HFA Inhaler 1Puff(s) Inhalation every 4 hours  buDESOnide 160 MICROgram(s)/formoterol 4.5 MICROgram(s) Inhaler 2Puff(s) Inhalation two times a day  tiotropium 18 MICROgram(s) Capsule 1Capsule(s) Inhalation daily    Antimicrobials:    Anticoagulants:  aspirin enteric coated 325milliGRAM(s) Oral two times a day    Cardiac:  losartan 50milliGRAM(s) Oral daily      Allergies    penicillins (Rash)    Intolerances        Vital Signs Last 24 Hrs  T(C): 37.1, Max: 37.2 (05-28 @ 13:38)  T(F): 98.7, Max: 98.9 (05-28 @ 13:38)  HR: 93 (80 - 106)  BP: 163/73 (132/63 - 167/74)  BP(mean): --  RR: 16 (16 - 18)  SpO2: 100% (97% - 100%)  I & Os for 24h ending 05-28 @ 07:00  =============================================  IN: 700 ml / OUT: 3200 ml / NET: -2500 ml    I & Os for current day (as of 05-28 @ 22:06)  =============================================  IN: 960 ml / OUT: 1350 ml / NET: -390 ml        LABS:      CBC Full  -  ( 28 May 2017 06:05 )  WBC Count : 9.8 K/uL  Hemoglobin : 7.5 g/dL  Hematocrit : 21.5 %  Platelet Count - Automated : 214 K/uL  Mean Cell Volume : 88.1 fL  Mean Cell Hemoglobin : 30.7 pg  Mean Cell Hemoglobin Concentration : 34.9 g/dL  Auto Neutrophil # : x  Auto Lymphocyte # : x  Auto Monocyte # : x  Auto Eosinophil # : x  Auto Basophil # : x  Auto Neutrophil % : x  Auto Lymphocyte % : x  Auto Monocyte % : x  Auto Eosinophil % : x  Auto Basophil % : x    05-28    131<L>  |  96  |  12  ----------------------------<  92  4.2   |  26  |  0.60    Ca    8.2<L>      28 May 2017 06:06                          RADIOLOGY & ADDITIONAL STUDIES (The following images were personally reviewed):  Stanton:                               No  Urine output:               Yes         DVT prophylaxis:         Yes          Flattus:                          Yes         Bowel movement:       Yes

## 2017-05-29 LAB
ANION GAP SERPL CALC-SCNC: 8 MMOL/L — SIGNIFICANT CHANGE UP (ref 5–17)
BUN SERPL-MCNC: 11 MG/DL — SIGNIFICANT CHANGE UP (ref 7–23)
CALCIUM SERPL-MCNC: 8.6 MG/DL — SIGNIFICANT CHANGE UP (ref 8.4–10.5)
CHLORIDE SERPL-SCNC: 96 MMOL/L — SIGNIFICANT CHANGE UP (ref 96–108)
CO2 SERPL-SCNC: 27 MMOL/L — SIGNIFICANT CHANGE UP (ref 22–31)
CREAT SERPL-MCNC: 0.6 MG/DL — SIGNIFICANT CHANGE UP (ref 0.5–1.3)
GLUCOSE SERPL-MCNC: 106 MG/DL — HIGH (ref 70–99)
HCT VFR BLD CALC: 27.1 % — LOW (ref 34.5–45)
HGB BLD-MCNC: 9.2 G/DL — LOW (ref 11.5–15.5)
MCHC RBC-ENTMCNC: 28.2 PG — SIGNIFICANT CHANGE UP (ref 27–34)
MCHC RBC-ENTMCNC: 33.9 G/DL — SIGNIFICANT CHANGE UP (ref 32–36)
MCV RBC AUTO: 83.1 FL — SIGNIFICANT CHANGE UP (ref 80–100)
PLATELET # BLD AUTO: 264 K/UL — SIGNIFICANT CHANGE UP (ref 150–400)
POTASSIUM SERPL-MCNC: 5 MMOL/L — SIGNIFICANT CHANGE UP (ref 3.5–5.3)
POTASSIUM SERPL-SCNC: 5 MMOL/L — SIGNIFICANT CHANGE UP (ref 3.5–5.3)
RBC # BLD: 3.26 M/UL — LOW (ref 3.8–5.2)
RBC # FLD: 18.7 % — HIGH (ref 10.3–16.9)
SODIUM SERPL-SCNC: 131 MMOL/L — LOW (ref 135–145)
WBC # BLD: 12.2 K/UL — HIGH (ref 3.8–10.5)
WBC # FLD AUTO: 12.2 K/UL — HIGH (ref 3.8–10.5)

## 2017-05-29 RX ORDER — TAMSULOSIN HYDROCHLORIDE 0.4 MG/1
0.4 CAPSULE ORAL AT BEDTIME
Qty: 0 | Refills: 0 | Status: DISCONTINUED | OUTPATIENT
Start: 2017-05-29 | End: 2017-05-30

## 2017-05-29 RX ORDER — LACTULOSE 10 G/15ML
15 SOLUTION ORAL DAILY
Qty: 0 | Refills: 0 | Status: DISCONTINUED | OUTPATIENT
Start: 2017-05-29 | End: 2017-05-30

## 2017-05-29 RX ADMIN — Medication 975 MILLIGRAM(S): at 14:12

## 2017-05-29 RX ADMIN — Medication 975 MILLIGRAM(S): at 13:12

## 2017-05-29 RX ADMIN — Medication 3 MILLILITER(S): at 12:34

## 2017-05-29 RX ADMIN — CELECOXIB 200 MILLIGRAM(S): 200 CAPSULE ORAL at 08:43

## 2017-05-29 RX ADMIN — BUDESONIDE AND FORMOTEROL FUMARATE DIHYDRATE 2 PUFF(S): 160; 4.5 AEROSOL RESPIRATORY (INHALATION) at 05:24

## 2017-05-29 RX ADMIN — LACTULOSE 15 GRAM(S): 10 SOLUTION ORAL at 18:28

## 2017-05-29 RX ADMIN — Medication 3 MILLILITER(S): at 17:19

## 2017-05-29 RX ADMIN — Medication 5 MILLIGRAM(S): at 12:34

## 2017-05-29 RX ADMIN — OXYCODONE HYDROCHLORIDE 10 MILLIGRAM(S): 5 TABLET ORAL at 13:34

## 2017-05-29 RX ADMIN — Medication 3 MILLILITER(S): at 05:23

## 2017-05-29 RX ADMIN — Medication 30 MILLILITER(S): at 15:33

## 2017-05-29 RX ADMIN — BUDESONIDE AND FORMOTEROL FUMARATE DIHYDRATE 2 PUFF(S): 160; 4.5 AEROSOL RESPIRATORY (INHALATION) at 17:20

## 2017-05-29 RX ADMIN — Medication 975 MILLIGRAM(S): at 05:23

## 2017-05-29 RX ADMIN — CELECOXIB 200 MILLIGRAM(S): 200 CAPSULE ORAL at 18:18

## 2017-05-29 RX ADMIN — Medication 100 MILLIGRAM(S): at 05:23

## 2017-05-29 RX ADMIN — OXYCODONE HYDROCHLORIDE 10 MILLIGRAM(S): 5 TABLET ORAL at 23:46

## 2017-05-29 RX ADMIN — Medication 975 MILLIGRAM(S): at 22:00

## 2017-05-29 RX ADMIN — Medication 100 MILLIGRAM(S): at 21:10

## 2017-05-29 RX ADMIN — Medication 325 MILLIGRAM(S): at 17:18

## 2017-05-29 RX ADMIN — TAMSULOSIN HYDROCHLORIDE 0.4 MILLIGRAM(S): 0.4 CAPSULE ORAL at 21:10

## 2017-05-29 RX ADMIN — CELECOXIB 200 MILLIGRAM(S): 200 CAPSULE ORAL at 09:43

## 2017-05-29 RX ADMIN — PANTOPRAZOLE SODIUM 40 MILLIGRAM(S): 20 TABLET, DELAYED RELEASE ORAL at 05:23

## 2017-05-29 RX ADMIN — Medication 975 MILLIGRAM(S): at 06:23

## 2017-05-29 RX ADMIN — Medication 3 MILLILITER(S): at 23:46

## 2017-05-29 RX ADMIN — Medication 975 MILLIGRAM(S): at 21:10

## 2017-05-29 RX ADMIN — TIOTROPIUM BROMIDE 1 CAPSULE(S): 18 CAPSULE ORAL; RESPIRATORY (INHALATION) at 12:34

## 2017-05-29 RX ADMIN — OXYCODONE HYDROCHLORIDE 10 MILLIGRAM(S): 5 TABLET ORAL at 12:34

## 2017-05-29 RX ADMIN — POLYETHYLENE GLYCOL 3350 17 GRAM(S): 17 POWDER, FOR SOLUTION ORAL at 12:37

## 2017-05-29 RX ADMIN — LIDOCAINE 1 PATCH: 4 CREAM TOPICAL at 13:08

## 2017-05-29 RX ADMIN — CELECOXIB 200 MILLIGRAM(S): 200 CAPSULE ORAL at 17:18

## 2017-05-29 RX ADMIN — Medication 100 MILLIGRAM(S): at 13:12

## 2017-05-29 RX ADMIN — LOSARTAN POTASSIUM 50 MILLIGRAM(S): 100 TABLET, FILM COATED ORAL at 05:23

## 2017-05-29 RX ADMIN — Medication 325 MILLIGRAM(S): at 05:23

## 2017-05-29 RX ADMIN — SIMVASTATIN 10 MILLIGRAM(S): 20 TABLET, FILM COATED ORAL at 21:20

## 2017-05-29 NOTE — PROGRESS NOTE ADULT - SUBJECTIVE AND OBJECTIVE BOX
SUBJECTIVE: Patient seen and examined.  No issues overnight. Pain well controlled.     OBJECTIVE:   Vital Signs Last 24 Hrs  T(C): 37.4, Max: 37.4 (05-29 @ 04:45)  T(F): 99.4, Max: 99.4 (05-29 @ 04:45)  HR: 90 (90 - 106)  BP: 162/85 (132/63 - 166/84)  BP(mean): --  RR: 16 (16 - 18)  SpO2: 100% (97% - 100%)    I&O's Summary    I & Os for current day (as of 29 May 2017 07:09)  =============================================  IN: 1180 ml / OUT: 2350 ml / NET: -1170 ml       LLE           Dressing: clean/dry/intact            Motor exam:  TA 5/5 EHL 5/5 GSC 5/5          Sensation:   T SILT SPH SILT DP SILT         Vascular:  Warm/pink/well perfused    A/P :   s/p   L hip IMN for IT fracture POD #5  -    Stanton reinserted last night  -    Pain control  -    DVT ppx: aspirin enteric coated 325milliGRAM(s) Oral two times a day  -    Check AM labs  -    Weight bearing status:  WBAT  -    Physical Therapy  -    Dispo: Rehab  -    Psychiatry consulted  -    Urology consulted

## 2017-05-29 NOTE — PROGRESS NOTE ADULT - SUBJECTIVE AND OBJECTIVE BOX
patient seen and examined. Please give flomax 0.4 QHS. patient needs time, PT with mobility, decrease narcotic use, and consistent BMs before next TOV. Can TOV when patient OOB, BM.

## 2017-05-30 VITALS
TEMPERATURE: 98 F | HEART RATE: 99 BPM | DIASTOLIC BLOOD PRESSURE: 55 MMHG | SYSTOLIC BLOOD PRESSURE: 115 MMHG | OXYGEN SATURATION: 96 % | RESPIRATION RATE: 17 BRPM

## 2017-05-30 DIAGNOSIS — F43.0 ACUTE STRESS REACTION: ICD-10-CM

## 2017-05-30 DIAGNOSIS — I35.0 NONRHEUMATIC AORTIC (VALVE) STENOSIS: ICD-10-CM

## 2017-05-30 LAB
ANION GAP SERPL CALC-SCNC: 9 MMOL/L — SIGNIFICANT CHANGE UP (ref 5–17)
BUN SERPL-MCNC: 11 MG/DL — SIGNIFICANT CHANGE UP (ref 7–23)
CALCIUM SERPL-MCNC: 8.2 MG/DL — LOW (ref 8.4–10.5)
CHLORIDE SERPL-SCNC: 97 MMOL/L — SIGNIFICANT CHANGE UP (ref 96–108)
CO2 SERPL-SCNC: 26 MMOL/L — SIGNIFICANT CHANGE UP (ref 22–31)
CREAT SERPL-MCNC: 0.6 MG/DL — SIGNIFICANT CHANGE UP (ref 0.5–1.3)
GLUCOSE SERPL-MCNC: 90 MG/DL — SIGNIFICANT CHANGE UP (ref 70–99)
HCT VFR BLD CALC: 24.9 % — LOW (ref 34.5–45)
HGB BLD-MCNC: 8.6 G/DL — LOW (ref 11.5–15.5)
MCHC RBC-ENTMCNC: 29 PG — SIGNIFICANT CHANGE UP (ref 27–34)
MCHC RBC-ENTMCNC: 34.5 G/DL — SIGNIFICANT CHANGE UP (ref 32–36)
MCV RBC AUTO: 83.8 FL — SIGNIFICANT CHANGE UP (ref 80–100)
PLATELET # BLD AUTO: 255 K/UL — SIGNIFICANT CHANGE UP (ref 150–400)
POTASSIUM SERPL-MCNC: 4.2 MMOL/L — SIGNIFICANT CHANGE UP (ref 3.5–5.3)
POTASSIUM SERPL-SCNC: 4.2 MMOL/L — SIGNIFICANT CHANGE UP (ref 3.5–5.3)
RBC # BLD: 2.97 M/UL — LOW (ref 3.8–5.2)
RBC # FLD: 19.2 % — HIGH (ref 10.3–16.9)
SODIUM SERPL-SCNC: 132 MMOL/L — LOW (ref 135–145)
WBC # BLD: 11.7 K/UL — HIGH (ref 3.8–10.5)
WBC # FLD AUTO: 11.7 K/UL — HIGH (ref 3.8–10.5)

## 2017-05-30 PROCEDURE — 86900 BLOOD TYPING SEROLOGIC ABO: CPT

## 2017-05-30 PROCEDURE — 36415 COLL VENOUS BLD VENIPUNCTURE: CPT

## 2017-05-30 PROCEDURE — 73552 X-RAY EXAM OF FEMUR 2/>: CPT

## 2017-05-30 PROCEDURE — 85610 PROTHROMBIN TIME: CPT

## 2017-05-30 PROCEDURE — 86901 BLOOD TYPING SEROLOGIC RH(D): CPT

## 2017-05-30 PROCEDURE — 73551 X-RAY EXAM OF FEMUR 1: CPT

## 2017-05-30 PROCEDURE — 86923 COMPATIBILITY TEST ELECTRIC: CPT

## 2017-05-30 PROCEDURE — 85027 COMPLETE CBC AUTOMATED: CPT

## 2017-05-30 PROCEDURE — 94640 AIRWAY INHALATION TREATMENT: CPT

## 2017-05-30 PROCEDURE — 97116 GAIT TRAINING THERAPY: CPT

## 2017-05-30 PROCEDURE — 71250 CT THORAX DX C-: CPT

## 2017-05-30 PROCEDURE — 81003 URINALYSIS AUTO W/O SCOPE: CPT

## 2017-05-30 PROCEDURE — 76000 FLUOROSCOPY <1 HR PHYS/QHP: CPT

## 2017-05-30 PROCEDURE — 93005 ELECTROCARDIOGRAM TRACING: CPT

## 2017-05-30 PROCEDURE — 99285 EMERGENCY DEPT VISIT HI MDM: CPT

## 2017-05-30 PROCEDURE — 71100 X-RAY EXAM RIBS UNI 2 VIEWS: CPT

## 2017-05-30 PROCEDURE — 73502 X-RAY EXAM HIP UNI 2-3 VIEWS: CPT

## 2017-05-30 PROCEDURE — 80053 COMPREHEN METABOLIC PANEL: CPT

## 2017-05-30 PROCEDURE — P9016: CPT

## 2017-05-30 PROCEDURE — 97161 PT EVAL LOW COMPLEX 20 MIN: CPT

## 2017-05-30 PROCEDURE — C1713: CPT

## 2017-05-30 PROCEDURE — 86850 RBC ANTIBODY SCREEN: CPT

## 2017-05-30 PROCEDURE — 99223 1ST HOSP IP/OBS HIGH 75: CPT

## 2017-05-30 PROCEDURE — 71045 X-RAY EXAM CHEST 1 VIEW: CPT

## 2017-05-30 PROCEDURE — C1769: CPT

## 2017-05-30 PROCEDURE — 93306 TTE W/DOPPLER COMPLETE: CPT

## 2017-05-30 PROCEDURE — 87641 MR-STAPH DNA AMP PROBE: CPT

## 2017-05-30 PROCEDURE — 99233 SBSQ HOSP IP/OBS HIGH 50: CPT | Mod: GC

## 2017-05-30 PROCEDURE — 96376 TX/PRO/DX INJ SAME DRUG ADON: CPT

## 2017-05-30 PROCEDURE — 80048 BASIC METABOLIC PNL TOTAL CA: CPT

## 2017-05-30 PROCEDURE — 87086 URINE CULTURE/COLONY COUNT: CPT

## 2017-05-30 PROCEDURE — 36430 TRANSFUSION BLD/BLD COMPNT: CPT

## 2017-05-30 PROCEDURE — 72170 X-RAY EXAM OF PELVIS: CPT

## 2017-05-30 RX ORDER — LACTULOSE 10 G/15ML
22.5 SOLUTION ORAL
Qty: 0 | Refills: 0 | COMMUNITY
Start: 2017-05-30

## 2017-05-30 RX ORDER — TAMSULOSIN HYDROCHLORIDE 0.4 MG/1
1 CAPSULE ORAL
Qty: 0 | Refills: 0 | COMMUNITY
Start: 2017-05-30

## 2017-05-30 RX ORDER — PANTOPRAZOLE SODIUM 20 MG/1
1 TABLET, DELAYED RELEASE ORAL
Qty: 0 | Refills: 0 | COMMUNITY
Start: 2017-05-30

## 2017-05-30 RX ADMIN — OXYCODONE HYDROCHLORIDE 10 MILLIGRAM(S): 5 TABLET ORAL at 14:00

## 2017-05-30 RX ADMIN — POLYETHYLENE GLYCOL 3350 17 GRAM(S): 17 POWDER, FOR SOLUTION ORAL at 13:24

## 2017-05-30 RX ADMIN — CELECOXIB 200 MILLIGRAM(S): 200 CAPSULE ORAL at 09:18

## 2017-05-30 RX ADMIN — OXYCODONE HYDROCHLORIDE 10 MILLIGRAM(S): 5 TABLET ORAL at 13:25

## 2017-05-30 RX ADMIN — LOSARTAN POTASSIUM 50 MILLIGRAM(S): 100 TABLET, FILM COATED ORAL at 06:26

## 2017-05-30 RX ADMIN — Medication 100 MILLIGRAM(S): at 13:27

## 2017-05-30 RX ADMIN — Medication 100 MILLIGRAM(S): at 06:26

## 2017-05-30 RX ADMIN — Medication 325 MILLIGRAM(S): at 06:26

## 2017-05-30 RX ADMIN — OXYCODONE HYDROCHLORIDE 10 MILLIGRAM(S): 5 TABLET ORAL at 00:30

## 2017-05-30 RX ADMIN — Medication 975 MILLIGRAM(S): at 13:59

## 2017-05-30 RX ADMIN — Medication 975 MILLIGRAM(S): at 06:25

## 2017-05-30 RX ADMIN — Medication 3 MILLILITER(S): at 13:27

## 2017-05-30 RX ADMIN — Medication 975 MILLIGRAM(S): at 13:25

## 2017-05-30 RX ADMIN — TIOTROPIUM BROMIDE 1 CAPSULE(S): 18 CAPSULE ORAL; RESPIRATORY (INHALATION) at 13:27

## 2017-05-30 RX ADMIN — LIDOCAINE 1 PATCH: 4 CREAM TOPICAL at 13:24

## 2017-05-30 RX ADMIN — OXYCODONE HYDROCHLORIDE 10 MILLIGRAM(S): 5 TABLET ORAL at 04:54

## 2017-05-30 RX ADMIN — Medication 3 MILLILITER(S): at 06:26

## 2017-05-30 RX ADMIN — PANTOPRAZOLE SODIUM 40 MILLIGRAM(S): 20 TABLET, DELAYED RELEASE ORAL at 06:26

## 2017-05-30 RX ADMIN — BUDESONIDE AND FORMOTEROL FUMARATE DIHYDRATE 2 PUFF(S): 160; 4.5 AEROSOL RESPIRATORY (INHALATION) at 06:26

## 2017-05-30 RX ADMIN — OXYCODONE HYDROCHLORIDE 10 MILLIGRAM(S): 5 TABLET ORAL at 05:30

## 2017-05-30 RX ADMIN — LIDOCAINE 1 PATCH: 4 CREAM TOPICAL at 00:00

## 2017-05-30 NOTE — CONSULT NOTE ADULT - ASSESSMENT
82 y/o female wtih COPD,HTN,comes in after mechanical fall, hip fracture s/p left hip intramedullary nailing 5/23/16.  Recovering well, going to rehab today. Severe AS found on her clearance echo.

## 2017-05-30 NOTE — PROGRESS NOTE ADULT - PROBLEM SELECTOR PLAN 2
- Known pulmonary amyloidosis  - no acute intervention
- Known pulmonary amyloidosis  - New CT reviewed. Some new nodules noted.   - no acute intervention at this time.
BP is stable and continue on Lisinopril

## 2017-05-30 NOTE — PROGRESS NOTE ADULT - EYES
EOMI; PERRL; no drainage or redness

## 2017-05-30 NOTE — PROGRESS NOTE ADULT - PROBLEM SELECTOR PROBLEM 8
Aortic stenosis

## 2017-05-30 NOTE — PROGRESS NOTE ADULT - PROBLEM SELECTOR PLAN 6
The Hiren stable and continue fluid restriction
The sodium is still low. The chest x-ray was fluid overload. Continue fluid Restriction and One dose of Lasix
The sodium is still low. The chest x-ray was fluid overload. Continue fluid Restriction and One dose of Lasix then emove the fierro
follow Na as it increased
follow Na

## 2017-05-30 NOTE — PROGRESS NOTE ADULT - LYMPH NODES
No lymphadedenopathy

## 2017-05-30 NOTE — PROGRESS NOTE ADULT - ATTENDING COMMENTS
ECHO in am to rule put wall motion abnormality and pulmonary hypertension.  DVT prophyllaxis
Agree. Please do keep her on triple inhaler tx in hospital: Breo or Symbicort, and Spiriva. Dr Peter covers for us for weekend if pulmonary issues arise.
Seen and examined by me. Continue bronchodilators. Will need radiographic follow up for nodules as outpatient.
My own patient with COPD and biopsy-proven pulmonary amyloidosis. Has been stable from pulm POV though gained weight and more sedentary. Admitted now w mechanical fall and hip fracture for OR today. Would continue Breo + Spiriva or formulary equivalent. No evidence of COPD exacerbation.
DVT prophylaxis with aspirin, SCD, and mobilization.
DVT prophylaxis with aspirin, SCD, and mobilization.
DVT prophylaxis with aspirin, SCD, and mobilization.  I discussed the case with ortho and the son.   She is to be continued on the moniotr.  IS, BARB BONE after lasix
DVT prophylaxis with aspirin, SCD, and mobilization.  I discussed the case with ortho and the son.   She is to be continued on the moniotr.  IS, OOB,  She is to be transfused one unit
DVT prophylaxis with aspirin, SCD, and mobilization.  I discussed the case with ortho and the son. DC monitor and I discussed the case with Dr Castillo to evaluate her AS
DVT prophyllaxis with aspirin, SCD, and mobilization.

## 2017-05-30 NOTE — PROGRESS NOTE ADULT - CONSTITUTIONAL
Well-developed, well nourished
detailed exam
Well-developed, well nourished

## 2017-05-30 NOTE — PROGRESS NOTE ADULT - PROBLEM SELECTOR PROBLEM 1
COPD (chronic obstructive pulmonary disease)

## 2017-05-30 NOTE — PROGRESS NOTE ADULT - PROBLEM SELECTOR PLAN 4
the ECHO reviewed.  The  patient has moderate severe AS with LVOT.  Avoid hypotension and volume loss.
the ECHO reviewed.  The  patient has moderate severe AS with LVOT.  Avoid hypotension and volume loss.  He is to be admitted to monitor bed postoperatively discontinue Stanton tomorrow
the ECHO reviewed.  The  patient has moderate severe AS with LVOT.  Avoid hypotension and volume loss.  He is to be admitted to monitor bed postoperatively either SICU or 9 W.  Evaluate postoperatively.  patient is high risk due to her cardiac and pulmonary status but her medical condition is optimized

## 2017-05-30 NOTE — BEHAVIORAL HEALTH ASSESSMENT NOTE - NSBHSUICPROTECTFACT_PSY_A_CORE
Supportive social network or family/Future oriented/Fear of death or dying due to pain/suffering/High spirituality/Identifies reasons for living/Responsibility to family and others/Positive therapeutic relationships

## 2017-05-30 NOTE — PROGRESS NOTE ADULT - PROBLEM SELECTOR PLAN 5
patient is clinically stable postoperatively.
for or

## 2017-05-30 NOTE — PROGRESS NOTE ADULT - PROBLEM SELECTOR PLAN 8
avoid hypotension and hypovolemia.  I discussed the case with the family and the patient will need evaluation by structural heart.
avoid hypotension and hypovolemia.  I discussed the case with the family and the patient will need evaluation by structural heart.
avoid hypotension and hypovolemia.  I discussed the case with the family and the patient will need evaluation by structural heart.  The CXR is more consistent with fluid overload rather than any pneumonic process.  I stopped the IV fluid and will give one dose of lasix in the am
avoid hypotension and hypovolemia.  Anaesthesia to evaluate preop

## 2017-05-30 NOTE — PROGRESS NOTE ADULT - PROBLEM SELECTOR PROBLEM 4
Preoperative clearance

## 2017-05-30 NOTE — BEHAVIORAL HEALTH ASSESSMENT NOTE - HPI (INCLUDE ILLNESS QUALITY, SEVERITY, DURATION, TIMING, CONTEXT, MODIFYING FACTORS, ASSOCIATED SIGNS AND SYMPTOMS)
Pt is a 82 yo  female with hx/o anxiety, who was admitted s/p hip fx. As per staff pt endorsed SI over the weekend. On this exam pt denies ever wanting to harm herself. She stated that she made this statement out of frustration with pain and need for further rehab. Pt denies depressed mood or SI. She is future oriented, stating she has many good friends and two supportive adult children who are there for her. Pt denies hx/o psychiatric treatment in the past. She reports that her PMD prescribed Zoloft 25 mg qday for her in the past to address anxiety which has resolved since then. Pt states she has not been compliant with Zoloft and is not willing to restart it now. Pt denied having memory deficit.

## 2017-05-30 NOTE — PROGRESS NOTE ADULT - SKIN
No lesions; no rash

## 2017-05-30 NOTE — PROGRESS NOTE ADULT - PROBLEM SELECTOR PROBLEM 5
Pain of left hip joint

## 2017-05-30 NOTE — PROGRESS NOTE ADULT - PROBLEM SELECTOR PROBLEM 3
Rib fractures
Hyperlipidemia

## 2017-05-30 NOTE — PROGRESS NOTE ADULT - PROBLEM SELECTOR PLAN 1
- Continue home Breo and Spiriva (Symbicort 160 may replace Breo in hospital)  - PRN Albuterol for shortness of breath  - Early Physical therapy and Inc Spirometry post-op
- COPD is stable keep on Symbicort, spiriva and albuterol. She resumes Breo in place of symbicort on discharge.   - encouraged physical therapy and Inc Spirometry
- Continue home Breo and Spiriva (Symbicort 160 may replace Breo in hospital)  - PRN Albuterol for shortness of breath  - Early Physical therapy and Inc Spirometry post-op
- COPD is stable keep on Symbicort, spiriva and albuterol. She resumes Breo in place of symbicort on discharge.   - encouraged physical therapy and Inc Spirometry
the patient is to be started on the bronchodialtor as instrructed by pulmonary postoperatively.  the oxygen saturation was normal with oxygen supplemention
the patient is to be started on the bronchodilator as instructed by pulmonary postoperatively.  the oxygen saturation was normal with oxygen supplementation
the patient is to be started on the bronchodilator as instructed by pulmonary postoperatively.  the oxygen saturation was normal with oxygen supplementation.  There is no Clinical evidence of COPD
the patient is to be started on the bronchodialtor as instrructed by pulmonary postoperatively

## 2017-05-30 NOTE — BEHAVIORAL HEALTH ASSESSMENT NOTE - NSBHADMITCOUNSEL_PSY_A_CORE
diagnostic results/impressions and/or recommended studies/importance of adherence to chosen treatment/prognosis/risks and benefits of treatment options/instructions for management, treatment and follow up/client/family/caregiver education

## 2017-05-30 NOTE — PROGRESS NOTE ADULT - PROBLEM SELECTOR PROBLEM 7
Pulmonary nodule

## 2017-05-30 NOTE — PROGRESS NOTE ADULT - NS NEC GEN PE MLT EXAM PC
No bruits; no thyromegaly or nodules

## 2017-05-30 NOTE — BEHAVIORAL HEALTH ASSESSMENT NOTE - NSBHCHARTREVIEWVS_PSY_A_CORE FT
Vital Signs Last 24 Hrs  T(C): 36.7, Max: 37.3 (05-29 @ 20:23)  T(F): 98, Max: 99.1 (05-29 @ 20:23)  HR: 99 (83 - 99)  BP: 115/55 (115/55 - 147/71)  BP(mean): --  RR: 17 (15 - 18)  SpO2: 96% (95% - 100%)

## 2017-05-30 NOTE — CONSULT NOTE ADULT - PROBLEM SELECTOR RECOMMENDATION 9
-fierro placed by nurses  -continue fierro till more OOB  -bowel regimen  -decrease use opiates.   -will follow
Dr. Castillo to evaluate the patient and likely have her F/U with him in the clinic for evaluation for possible TAVR after recovery from her hip procedure.  If she is a candidate, pre-TAVR work-up will be done as an out-patient.
the patient did not require any recent ER admission nor systemic steroids.  She is not on maintenance inhalers.  The patient has symptoms consistent with chronic bronchitis.  She is at risk for pulmonary complications.  Her exercise capacity is limited to not more than waking 2 blocks  She is to be started on bronchodilators.  There is no clinical evidence of pneumonia

## 2017-05-30 NOTE — PROGRESS NOTE ADULT - PROBLEM SELECTOR PLAN 3
- Pain control as needed  - incentive spirometry post-op
- Pain control as needed  - incentive spirometry
- Pain control as needed  - incentive spirometry post-op
- Pain control as needed  - incentive spirometry
follow statin

## 2017-05-30 NOTE — PROGRESS NOTE ADULT - BACK
No deformity or limitation of movement

## 2017-05-30 NOTE — PROGRESS NOTE ADULT - PROVIDER SPECIALTY LIST ADULT
Internal Medicine
Orthopedics
Pulmonology
Urology
Orthopedics

## 2017-05-30 NOTE — PROGRESS NOTE ADULT - SUBJECTIVE AND OBJECTIVE BOX
Interval Events: reviewed  Patient seen and examined at bedside.    Patient is a 81y old  Female who presents with a chief complaint of left hip pain, left rib pain (26 May 2017 11:21)  she is nevous and the catheter is out but she did not urinate yet.  She had an emema and had BM    PAST MEDICAL & SURGICAL HISTORY:      MEDICATIONS:  Pulmonary:  ALBUTerol/ipratropium for Nebulization 3milliLiter(s) Nebulizer every 6 hours  ALBUTerol    90 MICROgram(s) HFA Inhaler 1Puff(s) Inhalation every 4 hours  buDESOnide 160 MICROgram(s)/formoterol 4.5 MICROgram(s) Inhaler 2Puff(s) Inhalation two times a day  tiotropium 18 MICROgram(s) Capsule 1Capsule(s) Inhalation daily    Antimicrobials:    Anticoagulants:  aspirin enteric coated 325milliGRAM(s) Oral two times a day    Cardiac:  losartan 50milliGRAM(s) Oral daily      Allergies    penicillins (Rash)    Intolerances        Vital Signs Last 24 Hrs  T(C): 37.1, Max: 37.2 (05-28 @ 13:38)  T(F): 98.7, Max: 98.9 (05-28 @ 13:38)  HR: 93 (80 - 106)  BP: 163/73 (132/63 - 167/74)  BP(mean): --  RR: 16 (16 - 18)  SpO2: 100% (97% - 100%)  I & Os for 24h ending 05-28 @ 07:00  =============================================  IN: 700 ml / OUT: 3200 ml / NET: -2500 ml    I & Os for current day (as of 05-28 @ 22:06)  =============================================  IN: 960 ml / OUT: 1350 ml / NET: -390 ml        LABS:      CBC Full  -  ( 28 May 2017 06:05 )  WBC Count : 9.8 K/uL  Hemoglobin : 7.5 g/dL  Hematocrit : 21.5 %  Platelet Count - Automated : 214 K/uL  Mean Cell Volume : 88.1 fL  Mean Cell Hemoglobin : 30.7 pg  Mean Cell Hemoglobin Concentration : 34.9 g/dL  Auto Neutrophil # : x  Auto Lymphocyte # : x  Auto Monocyte # : x  Auto Eosinophil # : x  Auto Basophil # : x  Auto Neutrophil % : x  Auto Lymphocyte % : x  Auto Monocyte % : x  Auto Eosinophil % : x  Auto Basophil % : x    05-28    131<L>  |  96  |  12  ----------------------------<  92  4.2   |  26  |  0.60    Ca    8.2<L>      28 May 2017 06:06                          RADIOLOGY & ADDITIONAL STUDIES (The following images were personally reviewed):  Stanton:                               No  Urine output:               Yes         DVT prophylaxis:         Yes          Flattus:                          Yes         Bowel movement:       Yes

## 2017-05-30 NOTE — PROGRESS NOTE ADULT - EXTREMITIES
detailed exam
No cyanosis, clubbing or edema
No cyanosis, clubbing or edema
not examined
No cyanosis, clubbing or edema

## 2017-05-30 NOTE — PROGRESS NOTE ADULT - RESPIRATORY
Breath Sounds equal & clear to percussion & auscultation, no accessory muscle use

## 2017-05-30 NOTE — PROGRESS NOTE ADULT - PROBLEM SELECTOR PLAN 9
most likely related to COPD and AS.

## 2017-05-30 NOTE — PROGRESS NOTE ADULT - PROBLEM SELECTOR PROBLEM 2
Pulmonary nodule
HTN (hypertension)

## 2017-05-30 NOTE — PROGRESS NOTE ADULT - SUBJECTIVE AND OBJECTIVE BOX
ORTHO NOTE    [x ] Pt seen/examined.  [x ] Pt without any complaints/in NAD.    [ ] Pt complains of:      ROS: [ ] Fever  [ ] Chills  [ ] CP [ ] SOB [ ] Dysnea  [ ] Palpitations [ ] Cough [ ] N/V/C/D [ ] Paresthia [ ] Other     [x ] ROS  otherwise negative    .    PHYSICAL EXAM:    Vital Signs Last 24 Hrs  T(C): 36.3, Max: 37.4 (05-29 @ 13:12)  T(F): 97.3, Max: 99.3 (05-29 @ 13:12)  HR: 99 (83 - 99)  BP: 119/59 (117/57 - 147/71)  BP(mean): --  RR: 15 (15 - 18)  SpO2: 96% (95% - 100%)    I&O's Detail    I & Os for current day (as of 30 May 2017 12:09)  =============================================  IN:    Oral Fluid: 1080 ml    Total IN: 1080 ml  ---------------------------------------------  OUT:    Indwelling Catheter - Urethral: 1650 ml    Total OUT: 1650 ml  ---------------------------------------------  Total NET: -570 ml       CAPILLARY BLOOD GLUCOSE                  Neuro: AAOX3, pleasant    Lungs: 2L NC for exertion, continue COPD meds, no dyspnea noted    CV: NSR, stepped down    ABD: soft, nontender, bowel regimen    Ext: left hip MARTA added, aquacell removed from distal incision and can remain open to air, L LE NVID, strength 5/5    LABS                        8.6    11.7  )-----------( 255      ( 30 May 2017 05:47 )             24.9                                05-30    132<L>  |  97  |  11  ----------------------------<  90  4.2   |  26  |  0.60    Ca    8.2<L>      30 May 2017 05:47        [ ] Other Labs  [ ] None ordered            Please check or [place] when present:  •  Heart Failure:    [ ] Acute        [ ]  Acute on Chronic        [ ] Chronic         [ ] Diastolic     [ ]  Combined    •  CODY:     [ ] ATN        [ ]  Renal medullary necrosis       [ ]  Renal cortical necrosis                  [ ] Other pathological Lesion:  •  CKD:  [ ] Stage I   [ ] Stage II  [ ] Stage III    [ ]Stage IV   [ ]  CKD V   [ ]  Other/Unspecified:    •  Abdominal Nutritional Status:   [ ] Malnutrition-See Nutrition note    [ ] Cachexia   [ ]  Other        [ ] Supplement ordered:            [ ] Morbid Obesity: BMI >=40         ASSESSMENT/PLAN:      STATUS POST: L hip IM Nail  Medically cleared by psych and pulm  Continue fierro catheter per urology  CONTINUE:          [ ] PT- WBAT    [ ] DVT PPX- asa bid    [ ] Pain Mgt- po meds    [ ] Dispo plan- JOHN

## 2017-05-30 NOTE — PROGRESS NOTE ADULT - VASCULAR
Equal and normal pulses (carotid, femoral, dorsalis pedis)

## 2017-05-30 NOTE — PROGRESS NOTE ADULT - PROBLEM SELECTOR PLAN 7
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  CT scan reviewed.

## 2017-05-30 NOTE — PROGRESS NOTE ADULT - PROBLEM SELECTOR PROBLEM 9
PAH (pulmonary artery hypertension)

## 2017-05-30 NOTE — BEHAVIORAL HEALTH ASSESSMENT NOTE - SUMMARY
82 yo  female with hx/o anxiety symptoms, currently without acute mood symptoms, denying SI/plan. There is no evidence of significant cognitive or mood symptoms at this time. Pt is cleared for d/c from psychiatric perspective.

## 2017-06-01 DIAGNOSIS — I27.2 OTHER SECONDARY PULMONARY HYPERTENSION: ICD-10-CM

## 2017-06-01 DIAGNOSIS — J99 RESPIRATORY DISORDERS IN DISEASES CLASSIFIED ELSEWHERE: ICD-10-CM

## 2017-06-01 DIAGNOSIS — J44.9 CHRONIC OBSTRUCTIVE PULMONARY DISEASE, UNSPECIFIED: ICD-10-CM

## 2017-06-01 DIAGNOSIS — F32.89 OTHER SPECIFIED DEPRESSIVE EPISODES: ICD-10-CM

## 2017-06-01 DIAGNOSIS — M25.552 PAIN IN LEFT HIP: ICD-10-CM

## 2017-06-01 DIAGNOSIS — R91.8 OTHER NONSPECIFIC ABNORMAL FINDING OF LUNG FIELD: ICD-10-CM

## 2017-06-01 DIAGNOSIS — S72.142A DISPLACED INTERTROCHANTERIC FRACTURE OF LEFT FEMUR, INITIAL ENCOUNTER FOR CLOSED FRACTURE: ICD-10-CM

## 2017-06-01 DIAGNOSIS — Z87.891 PERSONAL HISTORY OF NICOTINE DEPENDENCE: ICD-10-CM

## 2017-06-01 DIAGNOSIS — I35.0 NONRHEUMATIC AORTIC (VALVE) STENOSIS: ICD-10-CM

## 2017-06-01 DIAGNOSIS — E87.1 HYPO-OSMOLALITY AND HYPONATREMIA: ICD-10-CM

## 2017-06-01 DIAGNOSIS — R33.9 RETENTION OF URINE, UNSPECIFIED: ICD-10-CM

## 2017-06-01 DIAGNOSIS — E78.5 HYPERLIPIDEMIA, UNSPECIFIED: ICD-10-CM
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
## 2017-06-01 DIAGNOSIS — Z88.0 ALLERGY STATUS TO PENICILLIN: ICD-10-CM

## 2017-06-01 DIAGNOSIS — D64.9 ANEMIA, UNSPECIFIED: ICD-10-CM

## 2017-06-01 DIAGNOSIS — I10 ESSENTIAL (PRIMARY) HYPERTENSION: ICD-10-CM

## 2017-06-01 DIAGNOSIS — M19.90 UNSPECIFIED OSTEOARTHRITIS, UNSPECIFIED SITE: ICD-10-CM

## 2017-06-01 DIAGNOSIS — E85.4 ORGAN-LIMITED AMYLOIDOSIS: ICD-10-CM

## 2017-06-13 ENCOUNTER — OUTPATIENT (OUTPATIENT)
Dept: OUTPATIENT SERVICES | Facility: HOSPITAL | Age: 82
LOS: 1 days | End: 2017-06-13
Payer: MEDICARE

## 2017-06-13 PROCEDURE — 73552 X-RAY EXAM OF FEMUR 2/>: CPT | Mod: 26,LT

## 2017-06-13 PROCEDURE — 73552 X-RAY EXAM OF FEMUR 2/>: CPT

## 2017-06-14 ENCOUNTER — APPOINTMENT (OUTPATIENT)
Dept: UROLOGY | Facility: CLINIC | Age: 82
End: 2017-06-14
Payer: COMMERCIAL

## 2017-06-14 VITALS
SYSTOLIC BLOOD PRESSURE: 147 MMHG | HEIGHT: 57.5 IN | BODY MASS INDEX: 32.13 KG/M2 | WEIGHT: 151 LBS | DIASTOLIC BLOOD PRESSURE: 75 MMHG | HEART RATE: 80 BPM | TEMPERATURE: 98.6 F

## 2017-06-14 PROCEDURE — 99204 OFFICE O/P NEW MOD 45 MIN: CPT | Mod: 25

## 2017-06-14 PROCEDURE — 51798 US URINE CAPACITY MEASURE: CPT

## 2017-06-21 ENCOUNTER — APPOINTMENT (OUTPATIENT)
Dept: UROLOGY | Facility: CLINIC | Age: 82
End: 2017-06-21

## 2017-06-26 ENCOUNTER — APPOINTMENT (OUTPATIENT)
Dept: CARDIOTHORACIC SURGERY | Facility: CLINIC | Age: 82
End: 2017-06-26

## 2017-06-26 VITALS
OXYGEN SATURATION: 96 % | DIASTOLIC BLOOD PRESSURE: 72 MMHG | SYSTOLIC BLOOD PRESSURE: 161 MMHG | RESPIRATION RATE: 18 BRPM | TEMPERATURE: 97.6 F | HEART RATE: 72 BPM

## 2017-06-26 DIAGNOSIS — I50.32 CHRONIC DIASTOLIC (CONGESTIVE) HEART FAILURE: ICD-10-CM

## 2017-06-30 PROBLEM — I50.32 CHRONIC DIASTOLIC HEART FAILURE: Status: ACTIVE | Noted: 2017-06-30

## 2017-06-30 RX ORDER — IPRATROPIUM BROMIDE AND ALBUTEROL SULFATE 2.5; .5 MG/3ML; MG/3ML
0.5-2.5 (3) SOLUTION RESPIRATORY (INHALATION) EVERY 6 HOURS
Refills: 0 | Status: ACTIVE | COMMUNITY

## 2017-06-30 RX ORDER — ASPIRIN 325 MG/1
325 TABLET ORAL
Refills: 0 | Status: ACTIVE | COMMUNITY

## 2017-07-11 ENCOUNTER — OUTPATIENT (OUTPATIENT)
Dept: OUTPATIENT SERVICES | Facility: HOSPITAL | Age: 82
LOS: 1 days | End: 2017-07-11
Payer: MEDICARE

## 2017-07-11 PROCEDURE — 73552 X-RAY EXAM OF FEMUR 2/>: CPT

## 2017-07-11 PROCEDURE — 73552 X-RAY EXAM OF FEMUR 2/>: CPT | Mod: 26,LT

## 2017-07-12 ENCOUNTER — APPOINTMENT (OUTPATIENT)
Dept: NEUROLOGY | Facility: CLINIC | Age: 82
End: 2017-07-12

## 2017-07-25 ENCOUNTER — APPOINTMENT (OUTPATIENT)
Dept: INTERNAL MEDICINE | Facility: CLINIC | Age: 82
End: 2017-07-25

## 2017-07-25 VITALS
BODY MASS INDEX: 32.58 KG/M2 | HEIGHT: 57 IN | OXYGEN SATURATION: 97 % | SYSTOLIC BLOOD PRESSURE: 118 MMHG | HEART RATE: 83 BPM | WEIGHT: 151 LBS | TEMPERATURE: 98.2 F | DIASTOLIC BLOOD PRESSURE: 70 MMHG

## 2017-07-25 RX ORDER — TIOTROPIUM BROMIDE 18 UG/1
18 CAPSULE ORAL; RESPIRATORY (INHALATION)
Qty: 30 | Refills: 0 | Status: COMPLETED | COMMUNITY
Start: 2016-04-07

## 2017-07-25 RX ORDER — SERTRALINE HYDROCHLORIDE 50 MG/1
50 TABLET, FILM COATED ORAL
Qty: 30 | Refills: 0 | Status: COMPLETED | COMMUNITY
Start: 2016-10-31

## 2017-07-25 RX ORDER — PANTOPRAZOLE 40 MG/1
40 TABLET, DELAYED RELEASE ORAL DAILY
Qty: 30 | Refills: 2 | Status: COMPLETED | COMMUNITY
End: 2017-07-25

## 2017-07-25 RX ORDER — LACTULOSE SOLUTION USP, 10 G/15 ML 10 G/15ML
10 SOLUTION ORAL; RECTAL
Qty: 946 | Refills: 0 | Status: COMPLETED | COMMUNITY
Start: 2017-06-28

## 2017-07-25 RX ORDER — POLYETHYLENE GLYCOL 3350 17 G/17G
17 POWDER, FOR SOLUTION ORAL
Qty: 527 | Refills: 0 | Status: COMPLETED | COMMUNITY
Start: 2017-06-28

## 2017-07-25 RX ORDER — NITROFURANTOIN MACROCRYSTALS 100 MG/1
100 CAPSULE ORAL
Qty: 21 | Refills: 0 | Status: COMPLETED | COMMUNITY
Start: 2017-06-28

## 2017-07-30 ENCOUNTER — FORM ENCOUNTER (OUTPATIENT)
Age: 82
End: 2017-07-30

## 2017-07-31 ENCOUNTER — RX RENEWAL (OUTPATIENT)
Age: 82
End: 2017-07-31

## 2017-07-31 ENCOUNTER — APPOINTMENT (OUTPATIENT)
Dept: CARDIOTHORACIC SURGERY | Facility: CLINIC | Age: 82
End: 2017-07-31
Payer: MEDICARE

## 2017-07-31 ENCOUNTER — OUTPATIENT (OUTPATIENT)
Dept: OUTPATIENT SERVICES | Facility: HOSPITAL | Age: 82
LOS: 1 days | End: 2017-07-31
Payer: MEDICARE

## 2017-07-31 VITALS
TEMPERATURE: 97.3 F | SYSTOLIC BLOOD PRESSURE: 144 MMHG | DIASTOLIC BLOOD PRESSURE: 97 MMHG | BODY MASS INDEX: 32.89 KG/M2 | WEIGHT: 152 LBS | OXYGEN SATURATION: 88 % | HEART RATE: 67 BPM

## 2017-07-31 DIAGNOSIS — I35.0 NONRHEUMATIC AORTIC (VALVE) STENOSIS: ICD-10-CM

## 2017-07-31 PROCEDURE — 93306 TTE W/DOPPLER COMPLETE: CPT

## 2017-07-31 PROCEDURE — 93306 TTE W/DOPPLER COMPLETE: CPT | Mod: 26

## 2017-07-31 PROCEDURE — 99214 OFFICE O/P EST MOD 30 MIN: CPT

## 2017-08-02 ENCOUNTER — APPOINTMENT (OUTPATIENT)
Dept: PULMONOLOGY | Facility: CLINIC | Age: 82
End: 2017-08-02
Payer: MEDICARE

## 2017-08-02 VITALS
HEIGHT: 57 IN | DIASTOLIC BLOOD PRESSURE: 58 MMHG | BODY MASS INDEX: 32.79 KG/M2 | TEMPERATURE: 99.1 F | OXYGEN SATURATION: 90 % | WEIGHT: 152 LBS | HEART RATE: 72 BPM | SYSTOLIC BLOOD PRESSURE: 102 MMHG

## 2017-08-02 DIAGNOSIS — E85.4 ORGAN-LIMITED AMYLOIDOSIS: ICD-10-CM

## 2017-08-02 DIAGNOSIS — J99 ORGAN-LIMITED AMYLOIDOSIS: ICD-10-CM

## 2017-08-02 PROCEDURE — 94010 BREATHING CAPACITY TEST: CPT

## 2017-08-02 PROCEDURE — 99214 OFFICE O/P EST MOD 30 MIN: CPT | Mod: 25

## 2017-08-02 RX ORDER — FLUTICASONE FUROATE AND VILANTEROL TRIFENATATE 100; 25 UG/1; UG/1
100-25 POWDER RESPIRATORY (INHALATION)
Qty: 60 | Refills: 0 | Status: COMPLETED | COMMUNITY
Start: 2016-08-03

## 2017-08-02 RX ORDER — TAMSULOSIN HYDROCHLORIDE 0.4 MG/1
0.4 CAPSULE ORAL
Qty: 30 | Refills: 1 | Status: COMPLETED | COMMUNITY
End: 2017-08-02

## 2017-08-15 ENCOUNTER — APPOINTMENT (OUTPATIENT)
Dept: INTERNAL MEDICINE | Facility: CLINIC | Age: 82
End: 2017-08-15
Payer: MEDICARE

## 2017-08-15 VITALS
DIASTOLIC BLOOD PRESSURE: 62 MMHG | WEIGHT: 137 LBS | TEMPERATURE: 97.6 F | BODY MASS INDEX: 29.56 KG/M2 | SYSTOLIC BLOOD PRESSURE: 120 MMHG | HEART RATE: 71 BPM | OXYGEN SATURATION: 93 % | HEIGHT: 57 IN

## 2017-08-15 DIAGNOSIS — Z87.898 PERSONAL HISTORY OF OTHER SPECIFIED CONDITIONS: ICD-10-CM

## 2017-08-15 LAB
BILIRUB UR QL STRIP: NORMAL
CLARITY UR: NORMAL
COLLECTION METHOD: NORMAL
GLUCOSE UR-MCNC: NORMAL
HGB UR QL STRIP.AUTO: NORMAL
LEUKOCYTE ESTERASE UR QL STRIP: NORMAL
NITRITE UR QL STRIP: NORMAL
PH UR STRIP: 6
PROT UR STRIP-MCNC: NORMAL
SP GR UR STRIP: 1.02

## 2017-08-15 PROCEDURE — 81002 URINALYSIS NONAUTO W/O SCOPE: CPT

## 2017-08-15 PROCEDURE — 99214 OFFICE O/P EST MOD 30 MIN: CPT | Mod: 25

## 2017-08-22 ENCOUNTER — RX RENEWAL (OUTPATIENT)
Age: 82
End: 2017-08-22

## 2017-08-23 ENCOUNTER — APPOINTMENT (OUTPATIENT)
Dept: INTERNAL MEDICINE | Facility: CLINIC | Age: 82
End: 2017-08-23

## 2017-08-28 ENCOUNTER — APPOINTMENT (OUTPATIENT)
Dept: UROLOGY | Facility: CLINIC | Age: 82
End: 2017-08-28

## 2017-08-28 LAB — BACTERIA UR CULT: ABNORMAL

## 2017-09-29 ENCOUNTER — APPOINTMENT (OUTPATIENT)
Dept: OPHTHALMOLOGY | Facility: CLINIC | Age: 82
End: 2017-09-29

## 2017-10-10 ENCOUNTER — OUTPATIENT (OUTPATIENT)
Dept: OUTPATIENT SERVICES | Facility: HOSPITAL | Age: 82
LOS: 1 days | End: 2017-10-10
Payer: MEDICARE

## 2017-10-10 PROCEDURE — 73552 X-RAY EXAM OF FEMUR 2/>: CPT | Mod: 26,LT

## 2017-10-10 PROCEDURE — 73552 X-RAY EXAM OF FEMUR 2/>: CPT

## 2017-10-16 ENCOUNTER — APPOINTMENT (OUTPATIENT)
Dept: HEART AND VASCULAR | Facility: CLINIC | Age: 82
End: 2017-10-16

## 2017-10-18 ENCOUNTER — APPOINTMENT (OUTPATIENT)
Dept: INTERNAL MEDICINE | Facility: CLINIC | Age: 82
End: 2017-10-18

## 2017-12-07 ENCOUNTER — APPOINTMENT (OUTPATIENT)
Dept: INTERNAL MEDICINE | Facility: CLINIC | Age: 82
End: 2017-12-07
Payer: MEDICARE

## 2017-12-07 VITALS
HEIGHT: 57 IN | WEIGHT: 137 LBS | HEART RATE: 77 BPM | TEMPERATURE: 98.1 F | DIASTOLIC BLOOD PRESSURE: 70 MMHG | BODY MASS INDEX: 29.56 KG/M2 | OXYGEN SATURATION: 91 % | SYSTOLIC BLOOD PRESSURE: 150 MMHG

## 2017-12-07 DIAGNOSIS — N39.0 URINARY TRACT INFECTION, SITE NOT SPECIFIED: ICD-10-CM

## 2017-12-07 PROCEDURE — 99214 OFFICE O/P EST MOD 30 MIN: CPT | Mod: 25

## 2017-12-07 PROCEDURE — G0008: CPT

## 2017-12-07 PROCEDURE — 90662 IIV NO PRSV INCREASED AG IM: CPT

## 2017-12-07 PROCEDURE — 36415 COLL VENOUS BLD VENIPUNCTURE: CPT

## 2017-12-07 RX ORDER — CIPROFLOXACIN HYDROCHLORIDE 250 MG/1
250 TABLET, FILM COATED ORAL
Qty: 6 | Refills: 0 | Status: COMPLETED | COMMUNITY
Start: 2017-08-15 | End: 2017-12-07

## 2017-12-07 RX ORDER — ATORVASTATIN CALCIUM 20 MG/1
20 TABLET, FILM COATED ORAL
Qty: 30 | Refills: 0 | Status: COMPLETED | COMMUNITY
Start: 2017-01-17 | End: 2017-12-07

## 2017-12-11 LAB
ALBUMIN SERPL ELPH-MCNC: 4 G/DL
ALP BLD-CCNC: 89 U/L
ALT SERPL-CCNC: 10 U/L
ANION GAP SERPL CALC-SCNC: 15 MMOL/L
AST SERPL-CCNC: 21 U/L
BASOPHILS # BLD AUTO: 0.05 K/UL
BASOPHILS NFR BLD AUTO: 0.8 %
BILIRUB SERPL-MCNC: 0.5 MG/DL
BUN SERPL-MCNC: 18 MG/DL
CALCIUM SERPL-MCNC: 10.1 MG/DL
CHLORIDE SERPL-SCNC: 101 MMOL/L
CK SERPL-CCNC: 62 U/L
CO2 SERPL-SCNC: 25 MMOL/L
CREAT SERPL-MCNC: 0.8 MG/DL
EOSINOPHIL # BLD AUTO: 0.23 K/UL
EOSINOPHIL NFR BLD AUTO: 3.6
GLUCOSE SERPL-MCNC: 76 MG/DL
HCT VFR BLD CALC: 39.4 %
HGB BLD-MCNC: 12.8 G/DL
IMM GRANULOCYTES NFR BLD AUTO: 0.2 %
LYMPHOCYTES # BLD AUTO: 2.19 K/UL
LYMPHOCYTES NFR BLD AUTO: 34.7 %
MAN DIFF?: NORMAL
MCHC RBC-ENTMCNC: 30.3 PG
MCHC RBC-ENTMCNC: 32.5 GM/DL
MCV RBC AUTO: 93.4 FL
MONOCYTES # BLD AUTO: 0.61 K/UL
MONOCYTES NFR BLD AUTO: 9.7 %
NEUTROPHILS # BLD AUTO: 3.23 K/UL
NEUTROPHILS NFR BLD AUTO: 51 %
PLATELET # BLD AUTO: 251 K/UL
POTASSIUM SERPL-SCNC: 5 MMOL/L
PROT SERPL-MCNC: 7.2 G/DL
RBC # BLD: 4.22 M/UL
RBC # FLD: 15.2 %
SODIUM SERPL-SCNC: 141 MMOL/L
TSH SERPL-ACNC: 2.17 UIU/ML
VIT B12 SERPL-MCNC: 443 PG/ML
WBC # FLD AUTO: 6.32 K/UL

## 2017-12-14 ENCOUNTER — RX RENEWAL (OUTPATIENT)
Age: 82
End: 2017-12-14

## 2017-12-14 ENCOUNTER — APPOINTMENT (OUTPATIENT)
Dept: OPHTHALMOLOGY | Facility: CLINIC | Age: 82
End: 2017-12-14
Payer: MEDICARE

## 2017-12-14 PROCEDURE — 92014 COMPRE OPH EXAM EST PT 1/>: CPT

## 2017-12-14 PROCEDURE — 92226: CPT | Mod: 50

## 2017-12-28 ENCOUNTER — APPOINTMENT (OUTPATIENT)
Dept: OPHTHALMOLOGY | Facility: CLINIC | Age: 82
End: 2017-12-28

## 2018-01-05 ENCOUNTER — APPOINTMENT (OUTPATIENT)
Dept: PULMONOLOGY | Facility: CLINIC | Age: 83
End: 2018-01-05

## 2018-01-31 ENCOUNTER — APPOINTMENT (OUTPATIENT)
Dept: INTERNAL MEDICINE | Facility: CLINIC | Age: 83
End: 2018-01-31
Payer: MEDICARE

## 2018-01-31 VITALS
HEIGHT: 57 IN | HEART RATE: 77 BPM | WEIGHT: 124 LBS | OXYGEN SATURATION: 90 % | SYSTOLIC BLOOD PRESSURE: 130 MMHG | DIASTOLIC BLOOD PRESSURE: 70 MMHG | TEMPERATURE: 97.5 F | BODY MASS INDEX: 26.75 KG/M2

## 2018-01-31 DIAGNOSIS — R06.89 OTHER ABNORMALITIES OF BREATHING: ICD-10-CM

## 2018-01-31 DIAGNOSIS — Z92.29 PERSONAL HISTORY OF OTHER DRUG THERAPY: ICD-10-CM

## 2018-01-31 DIAGNOSIS — J06.9 ACUTE UPPER RESPIRATORY INFECTION, UNSPECIFIED: ICD-10-CM

## 2018-01-31 PROCEDURE — 99214 OFFICE O/P EST MOD 30 MIN: CPT

## 2018-01-31 RX ORDER — LEVOFLOXACIN 500 MG/1
500 TABLET, FILM COATED ORAL DAILY
Qty: 5 | Refills: 0 | Status: COMPLETED | COMMUNITY
Start: 2017-12-14 | End: 2018-01-31

## 2018-01-31 RX ORDER — BETAMETHASONE VALERATE 1.2 MG/G
0.1 OINTMENT TOPICAL
Qty: 45 | Refills: 0 | Status: ACTIVE | COMMUNITY
Start: 2017-10-03

## 2018-02-21 ENCOUNTER — APPOINTMENT (OUTPATIENT)
Dept: PULMONOLOGY | Facility: CLINIC | Age: 83
End: 2018-02-21
Payer: MEDICARE

## 2018-02-21 VITALS
DIASTOLIC BLOOD PRESSURE: 60 MMHG | SYSTOLIC BLOOD PRESSURE: 120 MMHG | HEIGHT: 57 IN | HEART RATE: 77 BPM | OXYGEN SATURATION: 94 % | BODY MASS INDEX: 26.75 KG/M2 | TEMPERATURE: 97.8 F | WEIGHT: 124 LBS

## 2018-02-21 PROCEDURE — 94010 BREATHING CAPACITY TEST: CPT

## 2018-02-21 PROCEDURE — 99214 OFFICE O/P EST MOD 30 MIN: CPT | Mod: 25

## 2018-02-21 RX ORDER — TIOTROPIUM BROMIDE INHALATION SPRAY 3.12 UG/1
2.5 SPRAY, METERED RESPIRATORY (INHALATION) DAILY
Qty: 1 | Refills: 11 | Status: ACTIVE | COMMUNITY
Start: 2018-02-21 | End: 1900-01-01

## 2018-06-06 ENCOUNTER — OUTPATIENT (OUTPATIENT)
Dept: OUTPATIENT SERVICES | Facility: HOSPITAL | Age: 83
LOS: 1 days | End: 2018-06-06
Payer: MEDICARE

## 2018-06-06 PROCEDURE — 73502 X-RAY EXAM HIP UNI 2-3 VIEWS: CPT

## 2018-06-06 PROCEDURE — 73502 X-RAY EXAM HIP UNI 2-3 VIEWS: CPT | Mod: 26,LT

## 2018-06-19 ENCOUNTER — OUTPATIENT (OUTPATIENT)
Dept: OUTPATIENT SERVICES | Facility: HOSPITAL | Age: 83
LOS: 1 days | End: 2018-06-19
Payer: MEDICARE

## 2018-06-19 ENCOUNTER — APPOINTMENT (OUTPATIENT)
Dept: INTERNAL MEDICINE | Facility: CLINIC | Age: 83
End: 2018-06-19
Payer: MEDICARE

## 2018-06-19 VITALS
DIASTOLIC BLOOD PRESSURE: 70 MMHG | SYSTOLIC BLOOD PRESSURE: 140 MMHG | BODY MASS INDEX: 25.89 KG/M2 | HEART RATE: 68 BPM | TEMPERATURE: 98.2 F | OXYGEN SATURATION: 94 % | HEIGHT: 57 IN | WEIGHT: 120 LBS

## 2018-06-19 DIAGNOSIS — Z12.31 ENCOUNTER FOR SCREENING MAMMOGRAM FOR MALIGNANT NEOPLASM OF BREAST: ICD-10-CM

## 2018-06-19 DIAGNOSIS — E87.1 HYPO-OSMOLALITY AND HYPONATREMIA: ICD-10-CM

## 2018-06-19 DIAGNOSIS — Z87.01 PERSONAL HISTORY OF PNEUMONIA (RECURRENT): ICD-10-CM

## 2018-06-19 DIAGNOSIS — Z01.00 ENCOUNTER FOR EXAMINATION OF EYES AND VISION W/OUT ABNORMAL FINDINGS: ICD-10-CM

## 2018-06-19 DIAGNOSIS — J44.9 CHRONIC OBSTRUCTIVE PULMONARY DISEASE, UNSPECIFIED: ICD-10-CM

## 2018-06-19 DIAGNOSIS — Z87.81 PERSONAL HISTORY OF (HEALED) TRAUMATIC FRACTURE: ICD-10-CM

## 2018-06-19 PROCEDURE — 78999 UNLISTED MISC PX DX NUC MED: CPT | Mod: 26

## 2018-06-19 PROCEDURE — 78320: CPT

## 2018-06-19 PROCEDURE — 78999 UNLISTED MISC PX DX NUC MED: CPT

## 2018-06-19 PROCEDURE — 78315 BONE IMAGING 3 PHASE: CPT | Mod: 26

## 2018-06-19 PROCEDURE — 36415 COLL VENOUS BLD VENIPUNCTURE: CPT

## 2018-06-19 PROCEDURE — 99214 OFFICE O/P EST MOD 30 MIN: CPT | Mod: 25

## 2018-06-19 RX ORDER — LIDOCAINE 5% 700 MG/1
5 PATCH TOPICAL
Qty: 1 | Refills: 0 | Status: COMPLETED | COMMUNITY
Start: 2017-07-03 | End: 2018-06-19

## 2018-06-19 RX ORDER — LIDOCAINE 5% 700 MG/1
5 PATCH TOPICAL
Qty: 30 | Refills: 0 | Status: COMPLETED | COMMUNITY
End: 2018-06-19

## 2018-06-19 RX ORDER — LIDOCAINE 5% 700 MG/1
5 PATCH TOPICAL
Qty: 1 | Refills: 0 | Status: COMPLETED | COMMUNITY
Start: 2017-07-31 | End: 2018-06-19

## 2018-06-19 RX ORDER — AZITHROMYCIN 250 MG/1
250 TABLET, FILM COATED ORAL
Qty: 6 | Refills: 0 | Status: COMPLETED | COMMUNITY
Start: 2018-01-24 | End: 2018-06-19

## 2018-06-19 RX ORDER — OSELTAMIVIR PHOSPHATE 75 MG/1
75 CAPSULE ORAL
Qty: 10 | Refills: 0 | Status: COMPLETED | COMMUNITY
Start: 2018-01-24 | End: 2018-06-19

## 2018-06-19 RX ORDER — DOXYCYCLINE HYCLATE 100 MG/1
100 CAPSULE ORAL
Qty: 20 | Refills: 0 | Status: COMPLETED | COMMUNITY
Start: 2018-01-26 | End: 2018-06-19

## 2018-06-19 RX ORDER — PREDNISONE 10 MG/1
10 TABLET ORAL
Qty: 15 | Refills: 0 | Status: COMPLETED | COMMUNITY
Start: 2018-02-21 | End: 2018-06-19

## 2018-06-19 NOTE — HISTORY OF PRESENT ILLNESS
[de-identified] : 83 y/o female with HTN, AS, h/o hip fracture, COPD is here for f/u. SHe is feeling "great." She hasn't seen cardiology since last summer. She saw PULM In February but has had no issues requiring a visit. \par She is having DEXA next week. She needs MAMMO script.\par She has cleaning help and her son stays with her often.\par She denies falls, dizziness, edema, CP, SOB. She exercises regularly and needs renewal for PT. \par

## 2018-06-19 NOTE — ASSESSMENT
[FreeTextEntry1] : 81 y/o female is here for f/u.\par I would like her to see cardiloigist for f/u.\par Pulm issues stable. Continue current regimen.\par Labs sent.\par DEXA/MAMMO this month.\par PT renewed.

## 2018-06-19 NOTE — PHYSICAL EXAM
[No Acute Distress] : no acute distress [Well Nourished] : well nourished [Well Developed] : well developed [Well-Appearing] : well-appearing [Normal Voice/Communication] : normal voice/communication [Normal Sclera/Conjunctiva] : normal sclera/conjunctiva [PERRL] : pupils equal round and reactive to light [Normal Outer Ear/Nose] : the outer ears and nose were normal in appearance [No Respiratory Distress] : no respiratory distress  [Normal Rate] : normal rate  [Regular Rhythm] : with a regular rhythm [No Edema] : there was no peripheral edema [Soft] : abdomen soft [Non-distended] : non-distended [No Masses] : no abdominal mass palpated [No HSM] : no HSM [Normal Affect] : the affect was normal [Alert and Oriented x3] : oriented to person, place, and time [Normal Insight/Judgement] : insight and judgment were intact [de-identified] : 3/6 VANGIE [de-identified] : uses walker

## 2018-06-20 LAB
25(OH)D3 SERPL-MCNC: 55.7 NG/ML
ALBUMIN SERPL ELPH-MCNC: 4.1 G/DL
ALP BLD-CCNC: 87 U/L
ALT SERPL-CCNC: 9 U/L
ANION GAP SERPL CALC-SCNC: 14 MMOL/L
AST SERPL-CCNC: 17 U/L
BILIRUB SERPL-MCNC: 0.6 MG/DL
BUN SERPL-MCNC: 26 MG/DL
CALCIUM SERPL-MCNC: 10.2 MG/DL
CHLORIDE SERPL-SCNC: 98 MMOL/L
CHOLEST SERPL-MCNC: 196 MG/DL
CHOLEST/HDLC SERPL: 2 RATIO
CK SERPL-CCNC: 58 U/L
CO2 SERPL-SCNC: 25 MMOL/L
CREAT SERPL-MCNC: 0.88 MG/DL
GLUCOSE SERPL-MCNC: 97 MG/DL
HBA1C MFR BLD HPLC: 5.5 %
HDLC SERPL-MCNC: 97 MG/DL
LDLC SERPL CALC-MCNC: 88 MG/DL
POTASSIUM SERPL-SCNC: 4.9 MMOL/L
PROT SERPL-MCNC: 7.1 G/DL
SODIUM SERPL-SCNC: 137 MMOL/L
TRIGL SERPL-MCNC: 54 MG/DL
TSH SERPL-ACNC: 2.56 UIU/ML

## 2018-06-21 ENCOUNTER — RX RENEWAL (OUTPATIENT)
Age: 83
End: 2018-06-21

## 2018-07-13 ENCOUNTER — APPOINTMENT (OUTPATIENT)
Dept: OPHTHALMOLOGY | Facility: CLINIC | Age: 83
End: 2018-07-13
Payer: MEDICARE

## 2018-07-13 PROCEDURE — 92014 COMPRE OPH EXAM EST PT 1/>: CPT

## 2018-11-05 ENCOUNTER — APPOINTMENT (OUTPATIENT)
Dept: INTERNAL MEDICINE | Facility: CLINIC | Age: 83
End: 2018-11-05
Payer: MEDICARE

## 2018-11-05 VITALS
TEMPERATURE: 98.3 F | DIASTOLIC BLOOD PRESSURE: 72 MMHG | WEIGHT: 118 LBS | HEIGHT: 57 IN | HEART RATE: 70 BPM | SYSTOLIC BLOOD PRESSURE: 138 MMHG | BODY MASS INDEX: 25.46 KG/M2 | OXYGEN SATURATION: 97 %

## 2018-11-05 DIAGNOSIS — I35.0 NONRHEUMATIC AORTIC (VALVE) STENOSIS: ICD-10-CM

## 2018-11-05 DIAGNOSIS — F32.9 MAJOR DEPRESSIVE DISORDER, SINGLE EPISODE, UNSPECIFIED: ICD-10-CM

## 2018-11-05 DIAGNOSIS — I10 ESSENTIAL (PRIMARY) HYPERTENSION: ICD-10-CM

## 2018-11-05 DIAGNOSIS — E78.00 PURE HYPERCHOLESTEROLEMIA, UNSPECIFIED: ICD-10-CM

## 2018-11-05 PROCEDURE — 36415 COLL VENOUS BLD VENIPUNCTURE: CPT

## 2018-11-05 PROCEDURE — 99214 OFFICE O/P EST MOD 30 MIN: CPT | Mod: 25

## 2018-11-05 RX ORDER — SERTRALINE 25 MG/1
25 TABLET, FILM COATED ORAL DAILY
Qty: 30 | Refills: 5 | Status: COMPLETED | COMMUNITY
End: 2018-11-05

## 2018-11-05 RX ORDER — CELECOXIB 200 MG/1
200 CAPSULE ORAL TWICE DAILY
Refills: 0 | Status: COMPLETED | COMMUNITY
End: 2018-11-05

## 2018-11-05 RX ORDER — OMEGA-3/DHA/EPA/FISH OIL 300-1000MG
1000 CAPSULE ORAL
Refills: 0 | Status: ACTIVE | COMMUNITY

## 2018-11-05 RX ORDER — VITAMIN E ACETATE 740 UNIT/G
POWDER (GRAM) MISCELLANEOUS
Refills: 0 | Status: ACTIVE | COMMUNITY

## 2018-11-05 RX ORDER — TIOTROPIUM BROMIDE 18 UG/1
18 CAPSULE ORAL; RESPIRATORY (INHALATION) DAILY
Qty: 1 | Refills: 3 | Status: COMPLETED | COMMUNITY
Start: 2017-08-02 | End: 2018-11-05

## 2018-11-05 RX ORDER — DONEPEZIL HYDROCHLORIDE 5 MG/1
5 TABLET ORAL
Qty: 30 | Refills: 3 | Status: ACTIVE | COMMUNITY
Start: 2017-04-19 | End: 1900-01-01

## 2018-11-05 RX ORDER — CHLORHEXIDINE GLUCONATE 4 %
325 (65 FE) LIQUID (ML) TOPICAL TWICE DAILY
Refills: 0 | Status: COMPLETED | COMMUNITY
End: 2018-11-05

## 2018-11-05 RX ORDER — FUROSEMIDE 40 MG/1
40 TABLET ORAL TWICE DAILY
Qty: 30 | Refills: 3 | Status: COMPLETED | COMMUNITY
End: 2018-11-05

## 2018-11-05 RX ORDER — BUDESONIDE AND FORMOTEROL FUMARATE DIHYDRATE 160; 4.5 UG/1; UG/1
160-4.5 AEROSOL RESPIRATORY (INHALATION) TWICE DAILY
Refills: 0 | Status: COMPLETED | COMMUNITY
End: 2018-11-05

## 2018-11-05 RX ORDER — SIMVASTATIN 10 MG/1
10 TABLET, FILM COATED ORAL DAILY
Qty: 30 | Refills: 4 | Status: ACTIVE | COMMUNITY
Start: 1900-01-01 | End: 1900-01-01

## 2018-11-05 RX ORDER — CHROMIUM 200 MCG
TABLET ORAL
Refills: 0 | Status: ACTIVE | COMMUNITY

## 2018-11-05 NOTE — HISTORY OF PRESENT ILLNESS
[de-identified] : 81 y/o female with AS, HTN, high lipids, COPD, depression/mild dementia is here for f/u. SHe feels "good." SHe is exercising regularly. She takes meds as directed except inhalers which she only uses once daily.She hasn't seen cardiology or pulmonology. SHe was given forms for DEXA and MAMMO last visit but never did either. Her son stays with her most nights during the week. SHe has a  that comes every other week. SHe uses a rollator when out on the street. \par \par SHe brought me a scarf and chocolate. HOwever, as she was checking out, she expressed her anger that this isn't her "yearly physical". She was informed that her son, who made the appointment, didn't state that's what she wanted and she said it was "our job to know and if she dies of breast cancer, it's my fault." She was given an appointment for a CPE and advised that she probably won't be screening for breast cancer than either given current Medicare guidelines. \par

## 2018-11-05 NOTE — REVIEW OF SYSTEMS
[Joint Pain] : joint pain [Headache] : no headache [Dizziness] : no dizziness [Memory Loss] : memory loss [Unsteady Walking] : ataxia [Negative] : Psychiatric

## 2018-11-05 NOTE — ASSESSMENT
[FreeTextEntry1] : 83 y/o female is here for f/u.\par She should see cardiology for repeat ECHO. BP ok on losartan. SHe self d/c;d the lasix but doesn't appear fluid overloaded.\par Meds refilled.\par Labs sent.\par Pt scheduled for CPE in 6 months. \par

## 2018-11-05 NOTE — PHYSICAL EXAM
[No Acute Distress] : no acute distress [Well Nourished] : well nourished [Well Developed] : well developed [Normal Sclera/Conjunctiva] : normal sclera/conjunctiva [Normal Outer Ear/Nose] : the outer ears and nose were normal in appearance [No JVD] : no jugular venous distention [No Lymphadenopathy] : no lymphadenopathy [Thyroid Normal, No Nodules] : the thyroid was normal and there were no nodules present [Clear to Auscultation] : lungs were clear to auscultation bilaterally [No Accessory Muscle Use] : no accessory muscle use [Normal Rate] : normal rate  [Regular Rhythm] : with a regular rhythm [No Edema] : there was no peripheral edema [No Extremity Clubbing/Cyanosis] : no extremity clubbing/cyanosis [Normal Supraclavicular Nodes] : no supraclavicular lymphadenopathy [Normal Posterior Cervical Nodes] : no posterior cervical lymphadenopathy [Normal Anterior Cervical Nodes] : no anterior cervical lymphadenopathy [No Rash] : no rash [de-identified] : 2/6 VANGIE [de-identified] : uses rollator for gait [de-identified] : emotionally labile, repeats herself

## 2018-11-06 LAB
ALBUMIN SERPL ELPH-MCNC: 4 G/DL
ALP BLD-CCNC: 87 U/L
ALT SERPL-CCNC: 10 U/L
ANION GAP SERPL CALC-SCNC: 13 MMOL/L
AST SERPL-CCNC: 14 U/L
BILIRUB SERPL-MCNC: 0.6 MG/DL
BUN SERPL-MCNC: 22 MG/DL
CALCIUM SERPL-MCNC: 9.6 MG/DL
CHLORIDE SERPL-SCNC: 101 MMOL/L
CHOLEST SERPL-MCNC: 193 MG/DL
CHOLEST/HDLC SERPL: 2.3 RATIO
CK SERPL-CCNC: 49 U/L
CO2 SERPL-SCNC: 25 MMOL/L
CREAT SERPL-MCNC: 0.73 MG/DL
GLUCOSE SERPL-MCNC: 85 MG/DL
HDLC SERPL-MCNC: 85 MG/DL
LDLC SERPL CALC-MCNC: 92 MG/DL
POTASSIUM SERPL-SCNC: 4.7 MMOL/L
PROT SERPL-MCNC: 7.3 G/DL
SODIUM SERPL-SCNC: 139 MMOL/L
TRIGL SERPL-MCNC: 80 MG/DL

## 2019-03-29 ENCOUNTER — APPOINTMENT (OUTPATIENT)
Dept: OPHTHALMOLOGY | Facility: CLINIC | Age: 84
End: 2019-03-29
Payer: MEDICARE

## 2019-03-29 PROCEDURE — 92014 COMPRE OPH EXAM EST PT 1/>: CPT

## 2019-04-18 NOTE — CONSULT NOTE ADULT - SUBJECTIVE AND OBJECTIVE BOX
Patient is a 81y old  Female who presents with a chief complaint of left hip pain, left rib pain (22 May 2017 17:20)      HPI:  80y female PMH COPD, HLD, depression was walking at home and fell mechanically. Denies LOC, other injury besides hip and rib pain, denies numbness or tingling. ROS grossly negative, patient accompanied by son. Lives at home. Ambulates independently at baseline. (22 May 2017 17:20)      PAST MEDICAL & SURGICAL HISTORY:  COPD  HTN  Hyperlipidemia    FAMILY HISTORY:  Non contributary    SOCIAL HISTORY:  Smoking Status: [ ] Current, [ x] Former, [ ] Never  Pack Years:    MEDICATIONS:  Pulmonary:    Antimicrobials:    Anticoagulants:    Onc:    GI/:  docusate sodium 100milliGRAM(s) Oral three times a day    Endocrine:    Cardiac:    Other Medications:  lactated ringers. 1000milliLiter(s) IV Continuous <Continuous>  oxyCODONE IR 10milliGRAM(s) Oral every 4 hours PRN  oxyCODONE IR 5milliGRAM(s) Oral every 4 hours PRN  morphine  - Injectable 2milliGRAM(s) IV Push every 4 hours PRN  morphine  - Injectable 4milliGRAM(s) IV Push every 4 hours PRN      Allergies    penicillins (Rash)    Intolerances        Vital Signs Last 24 Hrs  T(C): 35.8, Max: 36.7 (05-22 @ 15:12)  T(F): 96.4, Max: 98 (05-22 @ 15:12)  HR: 80 (80 - 91)  BP: 128/78 (128/78 - 167/114)  BP(mean): --  RR: 16 (16 - 16)  SpO2: 94% (88% - 94%)        LABS:      CBC Full  -  ( 22 May 2017 15:25 )  WBC Count : 12.8 K/uL  Hemoglobin : 13.7 g/dL  Hematocrit : 38.9 %  Platelet Count - Automated : 249 K/uL  Mean Cell Volume : 86.1 fL  Mean Cell Hemoglobin : 30.3 pg  Mean Cell Hemoglobin Concentration : 35.2 g/dL  Auto Neutrophil # : x  Auto Lymphocyte # : x  Auto Monocyte # : x  Auto Eosinophil # : x  Auto Basophil # : x  Auto Neutrophil % : x  Auto Lymphocyte % : x  Auto Monocyte % : x  Auto Eosinophil % : x  Auto Basophil % : x    05-22    131<L>  |  95<L>  |  16  ----------------------------<  120<H>  4.0   |  23  |  0.60    Ca    9.4      22 May 2017 15:25    TPro  7.5  /  Alb  4.0  /  TBili  0.4  /  DBili  x   /  AST  26  /  ALT  19  /  AlkPhos  98  05-22    PT/INR - ( 22 May 2017 15:25 )   PT: 11.4 sec;   INR: 1.03            EXAM:  XR CHEST 1 VIEW PORT IMMEDIATE                           PROCEDURE DATE:  11/29/2016                 INTERPRETATION:  CLINICAL INDICATION: 81-year-old for evaluation of   pneumothorax.      FINDINGS: Portable view of the chest is compared to3/2/2008 and   demonstrates negative for pneumothorax. No change in clustered probably   calcified nodularity within the left upper lobe. Normal heart size.   Patchy consolidation within the right lower lobe. Normal heart size.   Osteopenia.    EKG RSR Normal                      RADIOLOGY & ADDITIONAL STUDIES (The following images were personally reviewed):
HISTORY OF PRESENT ILLNESS:  80 y/o female with PMHx COPD, HLD, depression was walking at home and fell mechanically. Denies LOC, other injury besides hip and rib pain, denies numbness or tingling. ROS grossly negative, patient accompanied by son. Lives at home. Ambulates independently at baseline.   She underwent left hip intramedullary nailing 5/23/16. Has been recovering well and is ready for discharge to rehab today.  Denies any SOB, WEEKS, chest pain or LOC.  States she currently is in pain from her hip surgery, but no SOB or chest pain.      PMHx:   COPD, HLD, depression    PSHx Recent left hip intramedullary nailing.       MEDICATIONS  (STANDING):  docusate sodium 100milliGRAM(s) Oral three times a day  losartan 50milliGRAM(s) Oral daily  simvastatin 10milliGRAM(s) Oral at bedtime  ALBUTerol/ipratropium for Nebulization 3milliLiter(s) Nebulizer every 6 hours  ALBUTerol    90 MICROgram(s) HFA Inhaler 1Puff(s) Inhalation every 4 hours  aspirin enteric coated 325milliGRAM(s) Oral two times a day  polyethylene glycol 3350 17Gram(s) Oral daily  buDESOnide 160 MICROgram(s)/formoterol 4.5 MICROgram(s) Inhaler 2Puff(s) Inhalation two times a day  acetaminophen   Tablet. 975milliGRAM(s) Oral every 8 hours  lidocaine   Patch 1Patch Transdermal daily  celecoxib 200milliGRAM(s) Oral two times a day with meals  tiotropium 18 MICROgram(s) Capsule 1Capsule(s) Inhalation daily  pantoprazole    Tablet 40milliGRAM(s) Oral before breakfast  tamsulosin 0.4milliGRAM(s) Oral at bedtime    MEDICATIONS  (PRN):  acetaminophen   Tablet 650milliGRAM(s) Oral every 6 hours PRN For Temp over 38.3 C (100.94 F)  ondansetron Injectable 4milliGRAM(s) IV Push every 6 hours PRN Nausea and/or Vomiting  senna 2Tablet(s) Oral at bedtime PRN Constipation  oxyCODONE IR 5milliGRAM(s) Oral every 4 hours PRN Moderate Pain (4 - 6)  oxyCODONE IR 10milliGRAM(s) Oral every 4 hours PRN Severe Pain (7 - 10)  bisacodyl 5milliGRAM(s) Oral every 12 hours PRN Constipation  bisacodyl Suppository 10milliGRAM(s) Rectal daily PRN Constipation  sodium biphosphate Rectal Enema 1Enema Rectal daily PRN constipation  aluminum hydroxide/magnesium hydroxide/simethicone Suspension 30milliLiter(s) Oral every 4 hours PRN Dyspepsia  lactulose Syrup 15Gram(s) Oral daily PRN constipation      Allergies    penicillins (Rash)    Intolerances        SOCIAL HISTORY:  Smoker: Former smoker    FAMILY HISTORY: Non-contributory      Review of Systems  CONSTITUTIONAL:  Fevers / chills, sweats, fatigue, weight loss, weight gain                                    NEGATIVE  NEURO:  parathesias, seizures, syncope, confusion                                                                               NEGATIVE  EYES:  Blurry vision, discharge, pain, loss of vision                                                                                  NEGATIVE  ENMT:  Difficulty hearing, vertigo, dysphagia, epistaxis, recent dental work                                     NEGATIVE  CV:  Chest pain, palpitations, WEEKS, orthopnea                                                                                         NEGATIVE  RESPIRATORY:  Wheezing, SOB, cough / sputum, hemoptysis                                                              NEGATIVE  GI:  Nausea, vommiting, diarrhea, constipation, melena                                                                        NEGATIVE  : Hematuria, dysuria, urgency, incontinence                                                                                       NEGATIVE  MUSKULOSKELETAL:  +Left hip pain/swelling, and decreased ROM.                                                     POSITIVE  SKIN/BREAST:  rash, itching, goyo loss, masses                                                                                            NEGATIVE  PSYCH:  depresion, anxiety, suicidal ideation                                                                                             NEGATIVE  HEME/LYMPH:  bruises easily, enlarged lymph nodes, tender lymph nodes                                        NEGATIVE  ENDOCRINE:  cold intolerance, heat intolerance, polydipsia                                                                   NEGATIVE    PHYSICAL EXAM  Vital Signs Last 24 Hrs  T(C): 36.7, Max: 37.3 (05-29 @ 20:23)  T(F): 98, Max: 99.1 (05-29 @ 20:23)  HR: 99 (83 - 99)  BP: 115/55 (115/55 - 147/71)  BP(mean): --  RR: 17 (15 - 18)  SpO2: 96% (95% - 100%)    CONSTITUTIONAL:                                                                 NAD, looks comfortable.    NEURO:                                                                                             No focal deficits                      EYES:                                                                                                  WNL  ENMT:                                                                                                WNL  CV:                                                                                                   S1S2 +murmur heard right 2nd ICS and left sternal border.   RESPIRATORY:                                                                               clear B/L   GI:                                                                                                       Soft non tender  : FIERRO + / -                                                                                **Yes, Going to rehab with fierro in place**  MUSKULOSKELETAL:                                                                       limited ROM left lower extremity 2/2 pain.    SKIN / BREAST:                                                                                 WNL  PV: +pedal pulses.  generalized edema left leg, non-pitting.                                                           LABS:                        8.6    11.7  )-----------( 255      ( 30 May 2017 05:47 )             24.9     05-30    132<L>  |  97  |  11  ----------------------------<  90  4.2   |  26  |  0.60    Ca    8.2<L>      30 May 2017 05:47                  RADIOLOGY & ADDITIONAL STUDIES:  EXAM:  XR CHEST 1 VIEW PORT URGENT                          PROCEDURE DATE:  05/26/2017         INTERPRETATION:  Indication: COPD, desaturation    A single portable view of the chest is submitted. Comparison is made to   the most recentprior study dated 5/22/2017.     Cardiac size is   difficult to assess secondary to portable technique.  Bibasilar   atelectatic changes. No pneumothorax. No large pleural effusions.   Emphysematous changes noted. Linear calcification in left upper lung zone.    Impression:  Bibasilar atelectatic changes. CAROTID U/S:        TTE Normal left ventricular size and wall thickness. Probably normal left   ventricular wall motion.  The left ventricular ejection fraction is   normal.   The left ventricular ejection fraction is 60%.  The left atrial size is   normal. Calcified aortic valve. Aortic stenosis present, probably   moderate to   severe.The calculated aortic valve area using the continuity equation is   0.9   cm2.The peak pressure gradient is 42 mmHg, mean pressure gradient is 29   mmHg.The dimensionless index (ratio of LVOTvelocity to aortic velocity)   was   calculated to be 0.3.  The calculated stroke volume index is 32 cc/m2   (normal   >35cc/m2).No aortic regurgitation noted.There is mild pulmonary   hypertension.   The pulmonary artery systolic pressure is estimated to be 45 mmHg.  The   inferior vena cava is normal in size (<2.1 cm) with normal inspiratory   collapse (>50%) consistent with normal right atrial pressure.  No aortic   root   dilatation.Heterogenous space anterior to the heart; likely prominent   epicardial fat, cannot rule out small pericardial effusion.    EKG:  Diagnosis Line Sinus rhythm with 1st degree AV block  RSR' in V1  Nonspecific ST - T abnormalities  Poor quality tracing  Ventricular Rate 80 BPM    TTE / IDA:    Cardiac Cath:
HPI:  80y female PMH COPD, HLD, depression was walking at home and fell mechanically.  Admitted for hip fx- s/p ORIF on 5/23/17. Called to evaluate- failed TOV x 2. No h/o urinary issues in past. No dysuria/hematuria. No BM few days now. Not getting OOB as much since fall. No fever/chills.       PAST MEDICAL & SURGICAL HISTORY:      MEDICATIONS  (STANDING):  docusate sodium 100milliGRAM(s) Oral three times a day  losartan 50milliGRAM(s) Oral daily  simvastatin 10milliGRAM(s) Oral at bedtime  ALBUTerol/ipratropium for Nebulization 3milliLiter(s) Nebulizer every 6 hours  ALBUTerol    90 MICROgram(s) HFA Inhaler 1Puff(s) Inhalation every 4 hours  aspirin enteric coated 325milliGRAM(s) Oral two times a day  polyethylene glycol 3350 17Gram(s) Oral daily  buDESOnide 160 MICROgram(s)/formoterol 4.5 MICROgram(s) Inhaler 2Puff(s) Inhalation two times a day  acetaminophen   Tablet. 975milliGRAM(s) Oral every 8 hours  lidocaine   Patch 1Patch Transdermal daily  celecoxib 200milliGRAM(s) Oral two times a day with meals  tiotropium 18 MICROgram(s) Capsule 1Capsule(s) Inhalation daily  pantoprazole    Tablet 40milliGRAM(s) Oral before breakfast    MEDICATIONS  (PRN):  acetaminophen   Tablet 650milliGRAM(s) Oral every 6 hours PRN For Temp over 38.3 C (100.94 F)  ondansetron Injectable 4milliGRAM(s) IV Push every 6 hours PRN Nausea and/or Vomiting  senna 2Tablet(s) Oral at bedtime PRN Constipation  oxyCODONE IR 5milliGRAM(s) Oral every 4 hours PRN Moderate Pain (4 - 6)  oxyCODONE IR 10milliGRAM(s) Oral every 4 hours PRN Severe Pain (7 - 10)  bisacodyl 5milliGRAM(s) Oral every 12 hours PRN Constipation  bisacodyl Suppository 10milliGRAM(s) Rectal daily PRN Constipation  sodium biphosphate Rectal Enema 1Enema Rectal daily PRN constipation  ALPRAZolam 0.25milliGRAM(s) Oral every 12 hours PRN anxiety  aluminum hydroxide/magnesium hydroxide/simethicone Suspension 30milliLiter(s) Oral every 4 hours PRN Dyspepsia      Allergies    penicillins (Rash)    Intolerances        SOCIAL HISTORY:    FAMILY HISTORY:      Vital Signs Last 24 Hrs  T(C): 37.1, Max: 37.2 (05-28 @ 13:38)  T(F): 98.7, Max: 98.9 (05-28 @ 13:38)  HR: 93 (80 - 106)  BP: 163/73 (132/63 - 167/74)  BP(mean): --  RR: 16 (16 - 18)  SpO2: 100% (97% - 100%)    On PE:  General: alert and awake  Abdomen: soft, NT, ND    : FC intact, urine clear    EXT: no calf tenderness    LABS:                        7.5    9.8   )-----------( 214      ( 28 May 2017 06:05 )             21.5     05-28    131<L>  |  96  |  12  ----------------------------<  92  4.2   |  26  |  0.60    Ca    8.2<L>      28 May 2017 06:06    Culture - Urine (05.23.17 @ 08:21)    Specimen Source: .Urine None    Culture Results:   No growth              RADIOLOGY & ADDITIONAL STUDIES:
Bi-Rhombic Flap Text: The defect edges were debeveled with a #15 scalpel blade.  Given the location of the defect and the proximity to free margins a bi-rhombic flap was deemed most appropriate.  Using a sterile surgical marker, an appropriate rhombic flap was drawn incorporating the defect. The area thus outlined was incised deep to adipose tissue with a #15 scalpel blade.  The skin margins were undermined to an appropriate distance in all directions utilizing iris scissors.

## 2019-05-06 ENCOUNTER — APPOINTMENT (OUTPATIENT)
Dept: INTERNAL MEDICINE | Facility: CLINIC | Age: 84
End: 2019-05-06

## 2019-05-20 RX ORDER — FLUTICASONE FUROATE AND VILANTEROL TRIFENATATE 100; 25 UG/1; UG/1
100-25 POWDER RESPIRATORY (INHALATION) DAILY
Qty: 1 | Refills: 0 | Status: ACTIVE | COMMUNITY
Start: 2017-08-02 | End: 1900-01-01

## 2020-05-21 ENCOUNTER — NON-APPOINTMENT (OUTPATIENT)
Age: 85
End: 2020-05-21

## 2020-05-21 ENCOUNTER — APPOINTMENT (OUTPATIENT)
Dept: OPHTHALMOLOGY | Facility: CLINIC | Age: 85
End: 2020-05-21
Payer: MEDICARE

## 2020-05-21 PROCEDURE — 92014 COMPRE OPH EXAM EST PT 1/>: CPT

## 2020-12-07 NOTE — BEHAVIORAL HEALTH ASSESSMENT NOTE - FUND OF KNOWLEDGE
DISCHARGE SUMMARY    Mohan Hamilton was seen 3 times for evaluation and treatment.  Patient did not return for further treatment and current status is unknown.  Due to short treatment duration, no objective or functional changes were made.  Please see goal flow sheet from episode noted date below and initial evaluation for further information.  Patient is discharged from therapy and therapy episode is resolved as of 12/07/20.      Linked Episodes   Type: Episode: Status: Noted: Resolved: Last update: Updated by:   PHYSICAL THERAPY L shoulder 2.17.2020 Active 2/17/2020  3/2/2020 11:48 AM Santos Cabezas, PT      Comments:        Normal

## 2021-05-27 ENCOUNTER — NON-APPOINTMENT (OUTPATIENT)
Age: 86
End: 2021-05-27

## 2021-05-27 ENCOUNTER — APPOINTMENT (OUTPATIENT)
Dept: OPHTHALMOLOGY | Facility: CLINIC | Age: 86
End: 2021-05-27
Payer: MEDICARE

## 2021-05-27 PROCEDURE — 92014 COMPRE OPH EXAM EST PT 1/>: CPT

## 2021-06-10 NOTE — DISCHARGE NOTE ADULT - ABILITY TO HEAR (WITH HEARING AID OR HEARING APPLIANCE IF NORMALLY USED):
LATE ENTRY 5/25/17: Met with patient and her daughter today in clinic.  I got to see her before she met with Dr. Evans.  I offered her emotional support and encouragement.  She denies the need for any additional resources/needs.  She is aware that I will be leaving my position here at [facility].  I will plan on seeing her at an acupuncture appt next week at [facility] as well.  Janett Manzano RN   Adequate: hears normal conversation without difficulty

## 2022-06-02 ENCOUNTER — NON-APPOINTMENT (OUTPATIENT)
Age: 87
End: 2022-06-02

## 2022-06-02 ENCOUNTER — APPOINTMENT (OUTPATIENT)
Dept: OPHTHALMOLOGY | Facility: CLINIC | Age: 87
End: 2022-06-02
Payer: MEDICARE

## 2022-06-02 PROCEDURE — 92014 COMPRE OPH EXAM EST PT 1/>: CPT

## 2022-08-17 NOTE — PROGRESS NOTE ADULT - ASSESSMENT
Quality 226: Preventive Care And Screening: Tobacco Use: Screening And Cessation Intervention: Patient screened for tobacco use and is an ex/non-smoker Quality 431: Preventive Care And Screening: Unhealthy Alcohol Use - Screening: Patient screened for unhealthy alcohol use using a single question and scores less than 2 times per year Quality 47: Advance Care Plan: Advance Care Planning discussed and documented in the medical record; patient did not wish or was not able to name a surrogate decision maker or provide an advance care plan. 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

## 2023-01-01 ENCOUNTER — APPOINTMENT (OUTPATIENT)
Dept: OPHTHALMOLOGY | Facility: CLINIC | Age: 88
End: 2023-01-01
Payer: MEDICARE

## 2023-01-01 ENCOUNTER — NON-APPOINTMENT (OUTPATIENT)
Age: 88
End: 2023-01-01

## 2023-01-01 PROCEDURE — 92014 COMPRE OPH EXAM EST PT 1/>: CPT
